# Patient Record
Sex: FEMALE | Race: BLACK OR AFRICAN AMERICAN | NOT HISPANIC OR LATINO
[De-identification: names, ages, dates, MRNs, and addresses within clinical notes are randomized per-mention and may not be internally consistent; named-entity substitution may affect disease eponyms.]

---

## 2017-02-22 ENCOUNTER — RESULT REVIEW (OUTPATIENT)
Age: 62
End: 2017-02-22

## 2018-01-23 ENCOUNTER — APPOINTMENT (OUTPATIENT)
Dept: THORACIC SURGERY | Facility: CLINIC | Age: 63
End: 2018-01-23
Payer: COMMERCIAL

## 2018-01-23 VITALS
HEART RATE: 82 BPM | BODY MASS INDEX: 25.99 KG/M2 | DIASTOLIC BLOOD PRESSURE: 78 MMHG | HEIGHT: 65 IN | WEIGHT: 156 LBS | SYSTOLIC BLOOD PRESSURE: 104 MMHG | OXYGEN SATURATION: 98 %

## 2018-01-23 DIAGNOSIS — Z78.9 OTHER SPECIFIED HEALTH STATUS: ICD-10-CM

## 2018-01-23 PROCEDURE — 99205 OFFICE O/P NEW HI 60 MIN: CPT

## 2018-01-24 ENCOUNTER — APPOINTMENT (OUTPATIENT)
Dept: PULMONOLOGY | Facility: CLINIC | Age: 63
End: 2018-01-24
Payer: COMMERCIAL

## 2018-01-24 PROCEDURE — 94729 DIFFUSING CAPACITY: CPT

## 2018-01-24 PROCEDURE — 94726 PLETHYSMOGRAPHY LUNG VOLUMES: CPT

## 2018-01-24 PROCEDURE — 94060 EVALUATION OF WHEEZING: CPT

## 2018-01-29 ENCOUNTER — APPOINTMENT (OUTPATIENT)
Dept: NUCLEAR MEDICINE | Facility: IMAGING CENTER | Age: 63
End: 2018-01-29
Payer: COMMERCIAL

## 2018-01-29 ENCOUNTER — OUTPATIENT (OUTPATIENT)
Dept: OUTPATIENT SERVICES | Facility: HOSPITAL | Age: 63
LOS: 1 days | End: 2018-01-29
Payer: COMMERCIAL

## 2018-01-29 DIAGNOSIS — C34.90 MALIGNANT NEOPLASM OF UNSPECIFIED PART OF UNSPECIFIED BRONCHUS OR LUNG: ICD-10-CM

## 2018-01-29 DIAGNOSIS — R91.1 SOLITARY PULMONARY NODULE: ICD-10-CM

## 2018-01-29 PROCEDURE — A9552: CPT

## 2018-01-29 PROCEDURE — 78815 PET IMAGE W/CT SKULL-THIGH: CPT | Mod: 26,PI

## 2018-01-29 PROCEDURE — 78815 PET IMAGE W/CT SKULL-THIGH: CPT

## 2018-01-30 ENCOUNTER — OUTPATIENT (OUTPATIENT)
Dept: OUTPATIENT SERVICES | Facility: HOSPITAL | Age: 63
LOS: 1 days | End: 2018-01-30
Payer: COMMERCIAL

## 2018-01-30 ENCOUNTER — OUTPATIENT (OUTPATIENT)
Dept: OUTPATIENT SERVICES | Facility: HOSPITAL | Age: 63
LOS: 1 days | End: 2018-01-30

## 2018-01-30 ENCOUNTER — APPOINTMENT (OUTPATIENT)
Dept: NUCLEAR MEDICINE | Facility: HOSPITAL | Age: 63
End: 2018-01-30
Payer: COMMERCIAL

## 2018-01-30 VITALS
HEART RATE: 67 BPM | SYSTOLIC BLOOD PRESSURE: 100 MMHG | WEIGHT: 149.91 LBS | TEMPERATURE: 97 F | DIASTOLIC BLOOD PRESSURE: 70 MMHG | HEIGHT: 65 IN | OXYGEN SATURATION: 97 % | RESPIRATION RATE: 14 BRPM

## 2018-01-30 DIAGNOSIS — R91.1 SOLITARY PULMONARY NODULE: ICD-10-CM

## 2018-01-30 DIAGNOSIS — Z90.2 ACQUIRED ABSENCE OF LUNG [PART OF]: Chronic | ICD-10-CM

## 2018-01-30 LAB
BLD GP AB SCN SERPL QL: NEGATIVE — SIGNIFICANT CHANGE UP
BUN SERPL-MCNC: 18 MG/DL — SIGNIFICANT CHANGE UP (ref 7–23)
CALCIUM SERPL-MCNC: 9.5 MG/DL — SIGNIFICANT CHANGE UP (ref 8.4–10.5)
CHLORIDE SERPL-SCNC: 98 MMOL/L — SIGNIFICANT CHANGE UP (ref 98–107)
CO2 SERPL-SCNC: 34 MMOL/L — HIGH (ref 22–31)
CREAT SERPL-MCNC: 1.09 MG/DL — SIGNIFICANT CHANGE UP (ref 0.5–1.3)
GLUCOSE SERPL-MCNC: 91 MG/DL — SIGNIFICANT CHANGE UP (ref 70–99)
HCT VFR BLD CALC: 43.3 % — SIGNIFICANT CHANGE UP (ref 34.5–45)
HGB BLD-MCNC: 14.7 G/DL — SIGNIFICANT CHANGE UP (ref 11.5–15.5)
MCHC RBC-ENTMCNC: 31.7 PG — SIGNIFICANT CHANGE UP (ref 27–34)
MCHC RBC-ENTMCNC: 33.9 % — SIGNIFICANT CHANGE UP (ref 32–36)
MCV RBC AUTO: 93.5 FL — SIGNIFICANT CHANGE UP (ref 80–100)
NRBC # FLD: 0 — SIGNIFICANT CHANGE UP
PLATELET # BLD AUTO: 242 K/UL — SIGNIFICANT CHANGE UP (ref 150–400)
PMV BLD: 10.8 FL — SIGNIFICANT CHANGE UP (ref 7–13)
POTASSIUM SERPL-MCNC: 3.1 MMOL/L — LOW (ref 3.5–5.3)
POTASSIUM SERPL-SCNC: 3.1 MMOL/L — LOW (ref 3.5–5.3)
RBC # BLD: 4.63 M/UL — SIGNIFICANT CHANGE UP (ref 3.8–5.2)
RBC # FLD: 13.2 % — SIGNIFICANT CHANGE UP (ref 10.3–14.5)
RH IG SCN BLD-IMP: POSITIVE — SIGNIFICANT CHANGE UP
SODIUM SERPL-SCNC: 143 MMOL/L — SIGNIFICANT CHANGE UP (ref 135–145)
WBC # BLD: 9.35 K/UL — SIGNIFICANT CHANGE UP (ref 3.8–10.5)
WBC # FLD AUTO: 9.35 K/UL — SIGNIFICANT CHANGE UP (ref 3.8–10.5)

## 2018-01-30 PROCEDURE — 93010 ELECTROCARDIOGRAM REPORT: CPT

## 2018-01-30 PROCEDURE — 78597 LUNG PERFUSION DIFFERENTIAL: CPT | Mod: 26

## 2018-01-30 RX ORDER — SODIUM CHLORIDE 9 MG/ML
1000 INJECTION, SOLUTION INTRAVENOUS
Qty: 0 | Refills: 0 | Status: DISCONTINUED | OUTPATIENT
Start: 2018-02-15 | End: 2018-02-16

## 2018-01-30 NOTE — H&P PST ADULT - PSYCHIATRIC
negative Affect and characteristics of appearance, verbalizations, behaviors are appropriate details… detailed exam

## 2018-01-30 NOTE — H&P PST ADULT - NEGATIVE GENERAL GENITOURINARY SYMPTOMS
normal urinary frequency/no dysuria/no hematuria/no flank pain R/no bladder infections/no flank pain L

## 2018-01-30 NOTE — H&P PST ADULT - NEGATIVE BREAST SYMPTOMS
no breast tenderness R/no breast tenderness L/no breast lump L/no breast lump R/no nipple discharge L

## 2018-01-30 NOTE — H&P PST ADULT - HISTORY OF PRESENT ILLNESS
63y/o female scheduled for flexible bronchoscopy, right video assisted thoracoscopy, possible right thoracotomy, lung resection with epidural on 2/1/2018.  Pt states, "hx of left VATS, upper lobectomy, s/p chemo and radiation 2010.  Cat scan done every yr, 2 yrs ago identified nodule in right lung, f/u ct shows nodule has grown in size."

## 2018-01-30 NOTE — H&P PST ADULT - PMH
Benign Essential Hypertension    Hypercholesteremia Benign Essential Hypertension    Hypercholesteremia    Solitary pulmonary nodule

## 2018-01-30 NOTE — H&P PST ADULT - RS GEN PE MLT RESP DETAILS PC
breath sounds equal/clear to auscultation bilaterally breath sounds equal/no intercostal retractions/no rales/no wheezes/respirations non-labored/no chest wall tenderness/good air movement/clear to auscultation bilaterally

## 2018-01-30 NOTE — H&P PST ADULT - MUSCULOSKELETAL
negative No joint pain, swelling or deformity; no limitation of movement details… detailed exam normal strength/ROM intact

## 2018-01-30 NOTE — H&P PST ADULT - NEGATIVE CARDIOVASCULAR SYMPTOMS
no orthopnea/no chest pain/no palpitations/no claudication/no peripheral edema/no paroxysmal nocturnal dyspnea/no dyspnea on exertion

## 2018-01-30 NOTE — H&P PST ADULT - PROBLEM SELECTOR PLAN 1
Pt scheduled for flexible bronchoscopy, right video assisted thoracoscopy, possible right thoracotomy, lung resection with epidural.  labs done results pending, ekg done.  Pt stated, "Dr. Easley office requesting cardiology clearance"  hibiclens provided. Preop teaching done, pt able to verbalize understanding.

## 2018-01-30 NOTE — H&P PST ADULT - NEGATIVE GENERAL SYMPTOMS
no weight loss/no weight gain/no polyphagia/no polyuria/no fatigue/no fever/no chills/no sweating/no anorexia/no malaise/no polydipsia

## 2018-01-30 NOTE — H&P PST ADULT - NSANTHOSAYNRD_GEN_A_CORE
No. GLENDA screening performed.  STOP BANG Legend: 0-2 = LOW Risk; 3-4 = INTERMEDIATE Risk; 5-8 = HIGH Risk

## 2018-01-30 NOTE — H&P PST ADULT - GASTROINTESTINAL DETAILS
soft/no distention no rebound tenderness/bowel sounds normal/soft/no bruit/no distention/no masses palpable/no guarding/no organomegaly/no rigidity/nontender

## 2018-02-01 ENCOUNTER — TRANSCRIPTION ENCOUNTER (OUTPATIENT)
Age: 63
End: 2018-02-01

## 2018-02-12 ENCOUNTER — OUTPATIENT (OUTPATIENT)
Dept: OUTPATIENT SERVICES | Facility: HOSPITAL | Age: 63
LOS: 1 days | End: 2018-02-12

## 2018-02-12 DIAGNOSIS — Z90.2 ACQUIRED ABSENCE OF LUNG [PART OF]: Chronic | ICD-10-CM

## 2018-02-12 LAB
BLD GP AB SCN SERPL QL: NEGATIVE — SIGNIFICANT CHANGE UP
RH IG SCN BLD-IMP: POSITIVE — SIGNIFICANT CHANGE UP

## 2018-02-15 ENCOUNTER — INPATIENT (INPATIENT)
Facility: HOSPITAL | Age: 63
LOS: 0 days | Discharge: HOME | End: 2018-02-16
Attending: THORACIC SURGERY (CARDIOTHORACIC VASCULAR SURGERY) | Admitting: THORACIC SURGERY (CARDIOTHORACIC VASCULAR SURGERY)
Payer: COMMERCIAL

## 2018-02-15 ENCOUNTER — RESULT REVIEW (OUTPATIENT)
Age: 63
End: 2018-02-15

## 2018-02-15 ENCOUNTER — TRANSCRIPTION ENCOUNTER (OUTPATIENT)
Age: 63
End: 2018-02-15

## 2018-02-15 ENCOUNTER — APPOINTMENT (OUTPATIENT)
Dept: THORACIC SURGERY | Facility: HOSPITAL | Age: 63
End: 2018-02-15
Payer: COMMERCIAL

## 2018-02-15 VITALS
SYSTOLIC BLOOD PRESSURE: 96 MMHG | HEIGHT: 65 IN | OXYGEN SATURATION: 98 % | WEIGHT: 149.91 LBS | HEART RATE: 64 BPM | TEMPERATURE: 98 F | RESPIRATION RATE: 16 BRPM | DIASTOLIC BLOOD PRESSURE: 68 MMHG

## 2018-02-15 DIAGNOSIS — Z90.2 ACQUIRED ABSENCE OF LUNG [PART OF]: Chronic | ICD-10-CM

## 2018-02-15 DIAGNOSIS — R91.1 SOLITARY PULMONARY NODULE: ICD-10-CM

## 2018-02-15 LAB
ALBUMIN SERPL ELPH-MCNC: 4.1 G/DL — SIGNIFICANT CHANGE UP (ref 3.3–5)
ALP SERPL-CCNC: 63 U/L — SIGNIFICANT CHANGE UP (ref 40–120)
ALT FLD-CCNC: 11 U/L — SIGNIFICANT CHANGE UP (ref 4–33)
APTT BLD: 28.2 SEC — SIGNIFICANT CHANGE UP (ref 27.5–37.4)
AST SERPL-CCNC: 18 U/L — SIGNIFICANT CHANGE UP (ref 4–32)
BASE EXCESS BLDA CALC-SCNC: 4.4 MMOL/L — SIGNIFICANT CHANGE UP
BILIRUB SERPL-MCNC: 0.3 MG/DL — SIGNIFICANT CHANGE UP (ref 0.2–1.2)
BUN SERPL-MCNC: 18 MG/DL — SIGNIFICANT CHANGE UP (ref 7–23)
CA-I BLDA-SCNC: 1.2 MMOL/L — SIGNIFICANT CHANGE UP (ref 1.15–1.29)
CALCIUM SERPL-MCNC: 8.7 MG/DL — SIGNIFICANT CHANGE UP (ref 8.4–10.5)
CHLORIDE SERPL-SCNC: 102 MMOL/L — SIGNIFICANT CHANGE UP (ref 98–107)
CO2 SERPL-SCNC: 29 MMOL/L — SIGNIFICANT CHANGE UP (ref 22–31)
CREAT SERPL-MCNC: 0.99 MG/DL — SIGNIFICANT CHANGE UP (ref 0.5–1.3)
GLUCOSE BLDA-MCNC: 114 MG/DL — HIGH (ref 70–99)
GLUCOSE SERPL-MCNC: 116 MG/DL — HIGH (ref 70–99)
HCO3 BLDA-SCNC: 28 MMOL/L — HIGH (ref 22–26)
HCT VFR BLD CALC: 39.2 % — SIGNIFICANT CHANGE UP (ref 34.5–45)
HCT VFR BLDA CALC: 40 % — SIGNIFICANT CHANGE UP (ref 34.5–46.5)
HGB BLD-MCNC: 13.2 G/DL — SIGNIFICANT CHANGE UP (ref 11.5–15.5)
HGB BLDA-MCNC: 13 G/DL — SIGNIFICANT CHANGE UP (ref 11.5–15.5)
INR BLD: 0.98 — SIGNIFICANT CHANGE UP (ref 0.88–1.17)
LACTATE BLDA-SCNC: 0.6 MMOL/L — SIGNIFICANT CHANGE UP (ref 0.5–2)
MAGNESIUM SERPL-MCNC: 2 MG/DL — SIGNIFICANT CHANGE UP (ref 1.6–2.6)
MCHC RBC-ENTMCNC: 31.3 PG — SIGNIFICANT CHANGE UP (ref 27–34)
MCHC RBC-ENTMCNC: 33.7 % — SIGNIFICANT CHANGE UP (ref 32–36)
MCV RBC AUTO: 92.9 FL — SIGNIFICANT CHANGE UP (ref 80–100)
NRBC # FLD: 0 — SIGNIFICANT CHANGE UP
PCO2 BLDA: 50 MMHG — HIGH (ref 32–48)
PH BLDA: 7.38 PH — SIGNIFICANT CHANGE UP (ref 7.35–7.45)
PHOSPHATE SERPL-MCNC: 3.1 MG/DL — SIGNIFICANT CHANGE UP (ref 2.5–4.5)
PLATELET # BLD AUTO: 214 K/UL — SIGNIFICANT CHANGE UP (ref 150–400)
PMV BLD: 10.1 FL — SIGNIFICANT CHANGE UP (ref 7–13)
PO2 BLDA: 162 MMHG — HIGH (ref 83–108)
POTASSIUM BLDA-SCNC: 3.1 MMOL/L — LOW (ref 3.4–4.5)
POTASSIUM BLDV-SCNC: 3.3 MMOL/L — LOW (ref 3.4–4.5)
POTASSIUM SERPL-MCNC: 3.3 MMOL/L — LOW (ref 3.5–5.3)
POTASSIUM SERPL-SCNC: 3.3 MMOL/L — LOW (ref 3.5–5.3)
PROT SERPL-MCNC: 6.6 G/DL — SIGNIFICANT CHANGE UP (ref 6–8.3)
PROTHROM AB SERPL-ACNC: 10.9 SEC — SIGNIFICANT CHANGE UP (ref 9.8–13.1)
RBC # BLD: 4.22 M/UL — SIGNIFICANT CHANGE UP (ref 3.8–5.2)
RBC # FLD: 13.1 % — SIGNIFICANT CHANGE UP (ref 10.3–14.5)
SAO2 % BLDA: 99 % — SIGNIFICANT CHANGE UP (ref 95–99)
SODIUM BLDA-SCNC: 139 MMOL/L — SIGNIFICANT CHANGE UP (ref 136–146)
SODIUM SERPL-SCNC: 143 MMOL/L — SIGNIFICANT CHANGE UP (ref 135–145)
WBC # BLD: 15.97 K/UL — HIGH (ref 3.8–10.5)
WBC # FLD AUTO: 15.97 K/UL — HIGH (ref 3.8–10.5)

## 2018-02-15 PROCEDURE — 32669 THORACOSCOPY REMOVE SEGMENT: CPT

## 2018-02-15 PROCEDURE — 32666 THORACOSCOPY W/WEDGE RESECT: CPT | Mod: 59

## 2018-02-15 PROCEDURE — 32669 THORACOSCOPY REMOVE SEGMENT: CPT | Mod: AS

## 2018-02-15 PROCEDURE — 88305 TISSUE EXAM BY PATHOLOGIST: CPT | Mod: 26

## 2018-02-15 PROCEDURE — 32674 THORACOSCOPY LYMPH NODE EXC: CPT | Mod: AS

## 2018-02-15 PROCEDURE — 88309 TISSUE EXAM BY PATHOLOGIST: CPT | Mod: 26

## 2018-02-15 PROCEDURE — 71045 X-RAY EXAM CHEST 1 VIEW: CPT | Mod: 26

## 2018-02-15 PROCEDURE — 88307 TISSUE EXAM BY PATHOLOGIST: CPT | Mod: 26

## 2018-02-15 PROCEDURE — 32674 THORACOSCOPY LYMPH NODE EXC: CPT

## 2018-02-15 PROCEDURE — 32666 THORACOSCOPY W/WEDGE RESECT: CPT | Mod: AS,59

## 2018-02-15 PROCEDURE — S2900 ROBOTIC SURGICAL SYSTEM: CPT | Mod: NC

## 2018-02-15 PROCEDURE — 88331 PATH CONSLTJ SURG 1 BLK 1SPC: CPT | Mod: 26

## 2018-02-15 PROCEDURE — 99233 SBSQ HOSP IP/OBS HIGH 50: CPT

## 2018-02-15 RX ORDER — DOCUSATE SODIUM 100 MG
100 CAPSULE ORAL THREE TIMES A DAY
Qty: 0 | Refills: 0 | Status: DISCONTINUED | OUTPATIENT
Start: 2018-02-15 | End: 2018-02-16

## 2018-02-15 RX ORDER — ATENOLOL 25 MG/1
1 TABLET ORAL
Qty: 0 | Refills: 0 | COMMUNITY

## 2018-02-15 RX ORDER — ROSUVASTATIN CALCIUM 5 MG/1
1 TABLET ORAL
Qty: 0 | Refills: 0 | COMMUNITY

## 2018-02-15 RX ORDER — AMLODIPINE BESYLATE 2.5 MG/1
1 TABLET ORAL
Qty: 0 | Refills: 0 | COMMUNITY

## 2018-02-15 RX ORDER — FAMOTIDINE 10 MG/ML
20 INJECTION INTRAVENOUS
Qty: 0 | Refills: 0 | Status: DISCONTINUED | OUTPATIENT
Start: 2018-02-15 | End: 2018-02-16

## 2018-02-15 RX ORDER — HEPARIN SODIUM 5000 [USP'U]/ML
5000 INJECTION INTRAVENOUS; SUBCUTANEOUS EVERY 8 HOURS
Qty: 0 | Refills: 0 | Status: DISCONTINUED | OUTPATIENT
Start: 2018-02-15 | End: 2018-02-16

## 2018-02-15 RX ORDER — POTASSIUM CHLORIDE 20 MEQ
40 PACKET (EA) ORAL ONCE
Qty: 0 | Refills: 0 | Status: COMPLETED | OUTPATIENT
Start: 2018-02-15 | End: 2018-02-15

## 2018-02-15 RX ORDER — POTASSIUM CHLORIDE 20 MEQ
10 PACKET (EA) ORAL
Qty: 0 | Refills: 0 | Status: DISCONTINUED | OUTPATIENT
Start: 2018-02-15 | End: 2018-02-15

## 2018-02-15 RX ORDER — HYDROMORPHONE HYDROCHLORIDE 2 MG/ML
30 INJECTION INTRAMUSCULAR; INTRAVENOUS; SUBCUTANEOUS
Qty: 0 | Refills: 0 | Status: DISCONTINUED | OUTPATIENT
Start: 2018-02-15 | End: 2018-02-16

## 2018-02-15 RX ORDER — ATORVASTATIN CALCIUM 80 MG/1
20 TABLET, FILM COATED ORAL AT BEDTIME
Qty: 0 | Refills: 0 | Status: DISCONTINUED | OUTPATIENT
Start: 2018-02-15 | End: 2018-02-16

## 2018-02-15 RX ORDER — ONDANSETRON 8 MG/1
4 TABLET, FILM COATED ORAL EVERY 6 HOURS
Qty: 0 | Refills: 0 | Status: DISCONTINUED | OUTPATIENT
Start: 2018-02-15 | End: 2018-02-16

## 2018-02-15 RX ORDER — DEXAMETHASONE 0.5 MG/5ML
4 ELIXIR ORAL EVERY 6 HOURS
Qty: 0 | Refills: 0 | Status: DISCONTINUED | OUTPATIENT
Start: 2018-02-15 | End: 2018-02-16

## 2018-02-15 RX ADMIN — HEPARIN SODIUM 5000 UNIT(S): 5000 INJECTION INTRAVENOUS; SUBCUTANEOUS at 22:25

## 2018-02-15 RX ADMIN — HEPARIN SODIUM 5000 UNIT(S): 5000 INJECTION INTRAVENOUS; SUBCUTANEOUS at 15:06

## 2018-02-15 RX ADMIN — Medication 100 MILLIGRAM(S): at 22:25

## 2018-02-15 RX ADMIN — ATORVASTATIN CALCIUM 20 MILLIGRAM(S): 80 TABLET, FILM COATED ORAL at 22:25

## 2018-02-15 RX ADMIN — Medication 100 MILLIEQUIVALENT(S): at 15:44

## 2018-02-15 RX ADMIN — Medication 100 MILLIGRAM(S): at 15:06

## 2018-02-15 RX ADMIN — FAMOTIDINE 20 MILLIGRAM(S): 10 INJECTION INTRAVENOUS at 17:53

## 2018-02-15 RX ADMIN — Medication 40 MILLIEQUIVALENT(S): at 17:54

## 2018-02-15 RX ADMIN — SODIUM CHLORIDE 30 MILLILITER(S): 9 INJECTION, SOLUTION INTRAVENOUS at 15:07

## 2018-02-15 NOTE — BRIEF OPERATIVE NOTE - PROCEDURE
<<-----Click on this checkbox to enter Procedure Thoracoscopic segmentectomy  02/15/2018  Right uniport VATS, RLL wedge, RLL superior segmentectomy, lymph node dissection,  Intercostal nerve block  Active  [facility]

## 2018-02-15 NOTE — PROGRESS NOTE ADULT - SUBJECTIVE AND OBJECTIVE BOX
ROCIO AGUAYO          MRN-[id number]    HPI:  63y/o female scheduled for flexible bronchoscopy, right video assisted thoracoscopy, possible right thoracotomy, lung resection with epidural on 2/1/2018.  Pt states, "hx of left VATS, upper lobectomy, s/p chemo and radiation 2010.  Cat scan done every yr, 2 yrs ago identified nodule in right lung, f/u ct shows nodule has grown in size." (30 Jan 2018 15:13)      Procedure:  POD # :     Issues:        Interval/Overnight Events/ ROS  Pt remained hemodynamically stable overnight, not on any pressors or inotropes. OOB to chair, breathing comfortably with minimal pain. Ambulated several times . Denies pain, no SOB, no palpitations, no nausea/ no vomiting, no dizziness  A-line and fraser d/joaquina         PAST MEDICAL & SURGICAL HISTORY:  Solitary pulmonary nodule  Hypercholesteremia  Benign Essential Hypertension  S/P lobectomy of lung: left VATS, left upper lobectomy 2010  No Past Surgical History    Allergies    No Known Allergies    Intolerances            ***VITAL SIGNS:  Vital Signs Last 24 Hrs  T(C): 36.7 (15 Feb 2018 08:51), Max: 36.7 (15 Feb 2018 08:51)  T(F): 98.1 (15 Feb 2018 08:51), Max: 98.1 (15 Feb 2018 08:51)  HR: 64 (15 Feb 2018 08:51) (64 - 64)  BP: 96/68 (15 Feb 2018 08:51) (96/68 - 96/68)  BP(mean): --  RR: 16 (15 Feb 2018 08:51) (16 - 16)  SpO2: 98% (15 Feb 2018 08:51) (98% - 98%)    I/Os:   I&O's Detail      CAPILLARY BLOOD GLUCOSE          =======================  MEDICATIONS  ===================  MEDICATIONS  (STANDING):  atorvastatin 20 milliGRAM(s) Oral at bedtime  docusate sodium 100 milliGRAM(s) Oral three times a day  famotidine    Tablet 20 milliGRAM(s) Oral two times a day  heparin  Injectable 5000 Unit(s) SubCutaneous every 8 hours  HYDROmorphone PCA (1 mG/mL) 30 milliLiter(s) PCA Continuous PCA Continuous  lactated ringers. 1000 milliLiter(s) (30 mL/Hr) IV Continuous <Continuous>    MEDICATIONS  (PRN):  dexamethasone  Injectable 4 milliGRAM(s) IV Push every 6 hours PRN Nausea, IF ondansetron is ineffective after 30 - 60 minute  ondansetron Injectable 4 milliGRAM(s) IV Push every 6 hours PRN Nausea      ======================VENTILATOR SETTINGS  ==============      =================== PATIENT CARE ACCESS DEVICES ==========  Peripheral IV  Central Venous Line	R	L	IJ	Fem	SC			Placed:   Arterial Line	R	L	PT	DP	Fem	Rad	Ax	Placed:   Midline:				  Urinary Catheter, Date Placed:   Necessity of urinary, arterial, and venous catheters discussed    ======================= PHYSICAL EXAM===================  General:                         Comfortable, Awake, alert, not in any distress  Neuro:                            Moving all extremities to commands. No focal deficits	  HEENT:                           PRASHANTH/ ETT/ NGT/ trach  Respiratory:	Lungs clear on auscultation bilaterally with good aeration.                                           No rales, rhonchi, no wheezing. Effort even and unlabored.  CV:		Regular rate and rhythm. Normal S1/S2. No murmurs  Abdomen:	                     Soft,  nontender, not-distended. Bowel sounds present / absent.   Skin:		No rash.  Extremities:	Warm, no cyanosis or edema.  Palpable pulses    ============================ LABS =======================                      ===================== IMAGING STUDIES ===================  Radiology personally reviewed.    ====================ASSESSMENT AND PLAN ================      ====================== NEUROLOGY=======================  Pain control with PCA / PCEA / Tylenol IV / Toradol / Percocet  Pt is on Precedex for agitation  Pt is sedated with Propofol / Fentanyl    ==================== RESPIRATORY========================  Pt is on            L nasal canula / Face tent____% FiO2  Comfortable, no evidence of distress.  Using incentive spirometry & doing                ml  Monitor chest tube output  Chest tube to suction / water seal	    Mechanical Ventilation:    Mechanical ventilator status assessed & settings reviewed  Continue bronchodilators, pulmonary toilet  Head of bed elevation to 30-40 degrees    ====================CARDIOVASCULAR=====================  Continue hemodynamic monitoring/ telemetry  Not on any pressors  Continue cardiovascular / antihypertensive medications    ===================== RENAL ============================  Continue LR 30CC/hr      D/C IVF  Monitor I/Os, BUN/ Cr  and electrolytes  D/C Fraser      Keep Fraser  BPH: Continue Flomax/ Finasteride      ==================== GASTROINTESTINAL===================  On regular diet, tolerating well  Continue GI prophylaxis with Pepcid / Protonix  Continue Zofran / Reglan for nausea - PRN	  NPO    =======================    ENDOCRIN  =====================  Glycemic monitoring  F/S with coverage  ===================HEMATOLOGIC/ONCOLOGIC =============  Monitor chest tube output. No signs of active bleeding.   Follow CBC, coags  in AM  DVT prophylaxis with SCD, sc Heparin    ========================INFECTIOUS DISEASE===============  No signs of infection. Monitor for fever / leukocytosis.  All surgical incision / chest tube  sites look clean  D/C Fraser      Pertinent clinical, laboratory, radiographic, hemodynamic, echocardiographic, respiratory data, microbiologic data and chart were reviewed and analyzed frequently throughout the course of the day and night. GI and DVT prophylaxis, glycemic control, head of bed elevation and skin care issues were addressed.  Patient seen, examined and plan discussed with CT Surgery / CTICU team during rounds.  Pt remains critically ill in imminent risk of  deterioration and requires very careful cardio- pulmonary monitoring and support.    I have spent               minutes of critical care time with this pt between            am/pm    and               am/ pm         minutes spent on total encounter; more than 50% of the visit was spent counseling and/or coordinating care by the attending physician.        DESHAWN Donnelly MD ROCIO AGUAYO          MRN-[id number]    HPI:  63y/o female with RLL lung mass admitted  for flexible bronchoscopy, right video assisted thoracoscopy, possible right thoracotomy, lung resection .  Pt states, "hx of left VATS, upper lobectomy, s/p chemo and radiation 2010.  Cat scan done every yr, 2 yrs ago identified nodule in right lung, f/u ct shows nodule has grown in size."      Procedure:  02/15/18 Thoracoscopic segmentectomy,  Right uniport VATS, RLL wedge, RLL superior segmentectomy, lymph node dissection,  Intercostal nerve block   POD # : 0    Issues:    postop pain               right chest tube in place               Hx lung cancer 2010, HTn, dyslipidemia        Interval/OR Events/ ROS  Pt extubated in the OR post uneventful surgery. Arrived in ICU awake, c/o chest tube site pain; no SOB, no palpitations, no nausea         PAST MEDICAL & SURGICAL HISTORY:  Solitary pulmonary nodule  Hypercholesteremia  Benign Essential Hypertension  S/P lobectomy of lung: left VATS, left upper lobectomy 2010      Allergies  No Known Allergies          ***VITAL SIGNS:  Vital Signs Last 24 Hrs  T(C): 36.7 (15 Feb 2018 08:51), Max: 36.7 (15 Feb 2018 08:51)  T(F): 98.1 (15 Feb 2018 08:51), Max: 98.1 (15 Feb 2018 08:51)  HR: 64 (15 Feb 2018 08:51) (64 - 64)  BP: 96/68 (15 Feb 2018 08:51) (96/68 - 96/68)  BP(mean): --  RR: 16 (15 Feb 2018 08:51) (16 - 16)  SpO2: 98% (15 Feb 2018 08:51) (98% - 98%)    I/Os:   I&O's Detail      CAPILLARY BLOOD GLUCOSE          =======================  MEDICATIONS  ===================  MEDICATIONS  (STANDING):  atorvastatin 20 milliGRAM(s) Oral at bedtime  docusate sodium 100 milliGRAM(s) Oral three times a day  famotidine    Tablet 20 milliGRAM(s) Oral two times a day  heparin  Injectable 5000 Unit(s) SubCutaneous every 8 hours  HYDROmorphone PCA (1 mG/mL) 30 milliLiter(s) PCA Continuous  lactated ringers. 1000 milliLiter(s) (30 mL/Hr) IV Continuous    MEDICATIONS  (PRN):  dexamethasone  Injectable 4 milliGRAM(s) IV Push every 6 hours PRN Nausea, IF ondansetron is ineffective after 30 - 60 minute  ondansetron Injectable 4 milliGRAM(s) IV Push every 6 hours PRN Nausea          =================== PATIENT CARE ACCESS DEVICES ==========  Peripheral IV (+)   Arterial Line	Left / Rad(+)  No White    ======================= PHYSICAL EXAM===================  General:              Awake, alert, in mild  distress due to pain  Neuro:               Moving all extremities to commands. No focal deficits	  HEENT:                           PRASHANTH  Respiratory:	Lungs clear on auscultation bilaterally with good aeration.                           No rales, rhonchi, no wheezing. Effort even and unlabored.                          Right chest tube site - clean  CV:		Regular rate and rhythm. Normal S1/S2. No murmurs  Abdomen:         Soft,  nontender, not-distended. Bowel sounds present  Skin:		No rash.  Extremities:	Warm, no cyanosis or edema.  Palpable pulses    ============================ LABS =======================                      ===================== IMAGING STUDIES ===================  Radiology personally reviewed.    ====================ASSESSMENT AND PLAN ================  63y/o female with RLL lung mass  now is s/p  02/15/18 Thoracoscopic segmentectomy,  Right uniport VATS, RLL wedge, RLL superior segmentectomy, lymph node dissection,  Intercostal nerve block   POD # : 0    Issues:    postop pain               right chest tube in place               Hx lung cancer 2010, HTn, dyslipidemia      ====================== NEUROLOGY=======================  Pain control with PCA /Tylenol IV / Toradol / Percocet  Pt is on Precedex for agitation  Pt is sedated with Propofol / Fentanyl    ==================== RESPIRATORY========================  Pt is on            L nasal canula / Face tent____% FiO2  Comfortable, no evidence of distress.  Using incentive spirometry & doing                ml  Monitor chest tube output  Chest tube to suction / water seal	    Mechanical Ventilation:    Mechanical ventilator status assessed & settings reviewed  Continue bronchodilators, pulmonary toilet  Head of bed elevation to 30-40 degrees    ====================CARDIOVASCULAR=====================  Continue hemodynamic monitoring/ telemetry  Not on any pressors  Continue cardiovascular / antihypertensive medications    ===================== RENAL ============================  Continue LR 30CC/hr      D/C IVF  Monitor I/Os, BUN/ Cr  and electrolytes  D/C White      Keep White  BPH: Continue Flomax/ Finasteride      ==================== GASTROINTESTINAL===================  On regular diet, tolerating well  Continue GI prophylaxis with Pepcid / Protonix  Continue Zofran / Reglan for nausea - PRN	  NPO    =======================    ENDOCRIN  =====================  Glycemic monitoring  F/S with coverage  ===================HEMATOLOGIC/ONCOLOGIC =============  Monitor chest tube output. No signs of active bleeding.   Follow CBC, coags  in AM  DVT prophylaxis with SCD, sc Heparin    ========================INFECTIOUS DISEASE===============  No signs of infection. Monitor for fever / leukocytosis.  All surgical incision / chest tube  sites look clean  D/C White      Pertinent clinical, laboratory, radiographic, hemodynamic, echocardiographic, respiratory data, microbiologic data and chart were reviewed and analyzed frequently throughout the course of the day and night. GI and DVT prophylaxis, glycemic control, head of bed elevation and skin care issues were addressed.  Patient seen, examined and plan discussed with CT Surgery / CTICU team during rounds.  Pt remains critically ill in imminent risk of  deterioration and requires very careful cardio- pulmonary monitoring and support.    I have spent               minutes of critical care time with this pt between            am/pm    and               am/ pm         minutes spent on total encounter; more than 50% of the visit was spent counseling and/or coordinating care by the attending physician.        DESHAWN Donnelly MD ROCIO AGUAYO          MRN-[id number]    HPI:  61y/o female with RLL lung mass admitted  for flexible bronchoscopy, right video assisted thoracoscopy, possible right thoracotomy, lung resection .  Pt states, "hx of left VATS, upper lobectomy, s/p chemo and radiation 2010.  Cat scan done every yr, 2 yrs ago identified nodule in right lung, f/u ct shows nodule has grown in size."      Procedure:  02/15/18 Thoracoscopic segmentectomy,  Right uniport VATS, RLL wedge, RLL superior segmentectomy, lymph node dissection,  Intercostal nerve block   POD # : 0    Issues:    postop pain               right chest tube in place               Hx lung cancer 2010, HTn, dyslipidemia        Interval/OR Events/ ROS  Pt extubated in the OR post uneventful surgery. Arrived in ICU awake, c/o chest tube site pain; no SOB, no palpitations, no nausea         PAST MEDICAL & SURGICAL HISTORY:  Solitary pulmonary nodule  Hypercholesteremia  Benign Essential Hypertension  S/P lobectomy of lung: left VATS, left upper lobectomy 2010      Allergies  No Known Allergies          ***VITAL SIGNS:  Vital Signs Last 24 Hrs  T(C): 36.7 (15 Feb 2018 08:51), Max: 36.7 (15 Feb 2018 08:51)  T(F): 98.1 (15 Feb 2018 08:51), Max: 98.1 (15 Feb 2018 08:51)  HR: 64 (15 Feb 2018 08:51) (64 - 64)  BP: 96/68 (15 Feb 2018 08:51) (96/68 - 96/68)  BP(mean): --  RR: 16 (15 Feb 2018 08:51) (16 - 16)  SpO2: 98% (15 Feb 2018 08:51) (98% - 98%)    I/Os:   I&O's Detail        CAPILLARY BLOOD GLUCOSE          =======================  MEDICATIONS  ===================  MEDICATIONS  (STANDING):  atorvastatin 20 milliGRAM(s) Oral at bedtime  docusate sodium 100 milliGRAM(s) Oral three times a day  famotidine    Tablet 20 milliGRAM(s) Oral two times a day  heparin  Injectable 5000 Unit(s) SubCutaneous every 8 hours  HYDROmorphone PCA (1 mG/mL) 30 milliLiter(s) PCA Continuous  lactated ringers. 1000 milliLiter(s) (30 mL/Hr) IV Continuous    MEDICATIONS  (PRN):  dexamethasone  Injectable 4 milliGRAM(s) IV Push every 6 hours PRN Nausea, IF ondansetron is ineffective after 30 - 60 minute  ondansetron Injectable 4 milliGRAM(s) IV Push every 6 hours PRN Nausea          =================== PATIENT CARE ACCESS DEVICES ==========  Peripheral IV (+)   Arterial Line	Left / Rad(+)  No White    ======================= PHYSICAL EXAM===================  General:              Awake, alert, in mild  distress due to pain  Neuro:               Moving all extremities to commands. No focal deficits	  HEENT:                           PRASHANTH  Respiratory:	Lungs clear on auscultation bilaterally with good aeration.                           No rales, rhonchi, no wheezing. Effort even and unlabored.                          Right chest tube site - clean  CV:		Regular rate and rhythm. Normal S1/S2. No murmurs  Abdomen:         Soft,  nontender, not-distended. Bowel sounds present  Skin:		No rash.  Extremities:	Warm, no cyanosis or edema.  Palpable pulses    ============================ LABS =======================                      ===================== IMAGING STUDIES ===================  Radiology personally reviewed.    ====================ASSESSMENT AND PLAN ================  61y/o female with RLL lung mass  now is s/p  02/15/18 Thoracoscopic segmentectomy,  Right uniport VATS, RLL wedge, RLL superior segmentectomy, lymph node dissection,  Intercostal nerve block   POD # : 0    Issues:    postop pain               right chest tube in place               Hx lung cancer 2010, HTn, dyslipidemia      ====================== NEUROLOGY=======================  Pain control with PCA /Tylenol IV       ==================== RESPIRATORY========================  Pt is on    2        L nasal canula   Comfortable, no evidence of distress.  Incentive spirometry to start when pt's pain is controlled  Monitor chest tube output  Chest tube to  water seal	  Continue bronchodilators, pulmonary toilet  Head of bed elevation to 30-40 degrees  OOB in PM    ====================CARDIOVASCULAR=====================  Continue hemodynamic monitoring/ telemetry  Not on any pressors  Continue cardiovascular / antihypertensive medications    ===================== RENAL ============================  Continue LR 30CC/hr      D/C IVF  Monitor I/Os, BUN/ Cr  and electrolytes  D/C White      Keep White  BPH: Continue Flomax/ Finasteride      ==================== GASTROINTESTINAL===================  On regular diet, tolerating well  Continue GI prophylaxis with Pepcid / Protonix  Continue Zofran / Reglan for nausea - PRN	  NPO    =======================    ENDOCRIN  =====================  Glycemic monitoring  F/S with coverage  ===================HEMATOLOGIC/ONCOLOGIC =============  Monitor chest tube output. No signs of active bleeding.   Follow CBC, coags  in AM  DVT prophylaxis with SCD, sc Heparin    ========================INFECTIOUS DISEASE===============  No signs of infection. Monitor for fever / leukocytosis.  All surgical incision / chest tube  sites look clean  D/C White      Pertinent clinical, laboratory, radiographic, hemodynamic, echocardiographic, respiratory data, microbiologic data and chart were reviewed and analyzed frequently throughout the course of the day and night. GI and DVT prophylaxis, glycemic control, head of bed elevation and skin care issues were addressed.  Patient seen, examined and plan discussed with CT Surgery / CTICU team during rounds.  Pt remains critically ill in imminent risk of  deterioration and requires very careful cardio- pulmonary monitoring and support.    I have spent               minutes of critical care time with this pt between            am/pm    and               am/ pm         minutes spent on total encounter; more than 50% of the visit was spent counseling and/or coordinating care by the attending physician.        DESHAWN Donnelly MD ROCIO AGUAYO          MRN-[id number]    HPI:  63y/o female with RLL lung mass admitted  for flexible bronchoscopy, right video assisted thoracoscopy, possible right thoracotomy, lung resection .  Pt states, "hx of left VATS, upper lobectomy, s/p chemo and radiation 2010.  Cat scan done every yr, 2 yrs ago identified nodule in right lung, f/u ct shows nodule has grown in size."      Procedure:  02/15/18 Thoracoscopic segmentectomy,  Right uniport VATS, RLL wedge, RLL superior segmentectomy, lymph node dissection,  Intercostal nerve block   POD # : 0    Issues:    postop pain               right chest tube in place               Hx lung cancer 2010, HTN, dyslipidemia        Interval/OR Events/ ROS  Pt extubated in the OR post uneventful surgery. Arrived in ICU awake, c/o chest tube site pain; no SOB, no palpitations, no nausea         PAST MEDICAL & SURGICAL HISTORY:  Solitary pulmonary nodule  Hypercholesteremia  Benign Essential Hypertension  S/P lobectomy of lung: left VATS, left upper lobectomy 2010      Allergies  No Known Allergies          ***VITAL SIGNS:  Vital Signs Last 24 Hrs  T(C): 36.7 (15 Feb 2018 08:51), Max: 36.7 (15 Feb 2018 08:51)  T(F): 98.1 (15 Feb 2018 08:51), Max: 98.1 (15 Feb 2018 08:51)  HR: 64 (15 Feb 2018 08:51) (64 - 64)  BP: 96/68 (15 Feb 2018 08:51) (96/68 - 96/68)  BP(mean): --  RR: 16 (15 Feb 2018 08:51) (16 - 16)  SpO2: 98% (15 Feb 2018 08:51) (98% - 98%)    I/Os:   I&O's Detail        CAPILLARY BLOOD GLUCOSE          =======================  MEDICATIONS  ===================  MEDICATIONS  (STANDING):  atorvastatin 20 milliGRAM(s) Oral at bedtime  docusate sodium 100 milliGRAM(s) Oral three times a day  famotidine    Tablet 20 milliGRAM(s) Oral two times a day  heparin  Injectable 5000 Unit(s) SubCutaneous every 8 hours  HYDROmorphone PCA (1 mG/mL) 30 milliLiter(s) PCA Continuous  lactated ringers. 1000 milliLiter(s) (30 mL/Hr) IV Continuous    MEDICATIONS  (PRN):  dexamethasone  Injectable 4 milliGRAM(s) IV Push every 6 hours PRN Nausea, IF ondansetron is ineffective after 30 - 60 minute  ondansetron Injectable 4 milliGRAM(s) IV Push every 6 hours PRN Nausea          =================== PATIENT CARE ACCESS DEVICES ==========  Peripheral IV (+)   Arterial Line	Left / Rad(+)  No White    ======================= PHYSICAL EXAM===================  General:              Awake, alert, in mild  distress due to pain  Neuro:               Moving all extremities to commands. No focal deficits	  HEENT:                           PRASHANTH  Respiratory:	Lungs clear on auscultation bilaterally with good aeration.                           No rales, rhonchi, no wheezing. Effort even and unlabored.                          Right chest tube site - clean  CV:		Regular rate and rhythm. Normal S1/S2. No murmurs  Abdomen:         Soft,  nontender, not-distended. Bowel sounds present  Skin:		No rash.  Extremities:	Warm, no cyanosis or edema.  Palpable pulses    ============================ LABS =======================                      ===================== IMAGING STUDIES ===================  Radiology personally reviewed.    ====================ASSESSMENT AND PLAN ================  63y/o female with RLL lung mass  now is s/p  02/15/18 Thoracoscopic segmentectomy,  Right uniport VATS, RLL wedge, RLL superior segmentectomy, lymph node dissection,  Intercostal nerve block   POD # : 0    Issues:    postop pain               right chest tube in place               Hx lung cancer 2010, HTN, dyslipidemia      ====================== NEUROLOGY=======================  Pain control with PCA /Tylenol IV       ==================== RESPIRATORY========================  Pt is on    2        L nasal canula   Comfortable, no evidence of distress.  Incentive spirometry to start when pt's pain is controlled  Monitor chest tube output  Chest tube to  water seal	  Continue bronchodilators, pulmonary toilet  Head of bed elevation to 30-40 degrees  OOB in PM    ====================CARDIOVASCULAR=====================  Continue hemodynamic monitoring/ telemetry  Not on any pressors  Continue cardiovascular / antihypertensive medications    ===================== RENAL ============================  Continue LR 30CC/hr        Monitor I/Os, BUN/ Cr  and electrolytes  Voiding trial in progress ( no White cath)      ==================== GASTROINTESTINAL===================  NPO until fully awake post anesthesia and comfortable then will start clears  Continue GI prophylaxis with Pepcid   Zofran / Reglan for nausea - PRN	      =======================    ENDOCRIN  =====================  Glycemic monitoring  F/S with coverage  ===================HEMATOLOGIC/ONCOLOGIC =============  Monitor chest tube output. No signs of active bleeding.   Follow CBC, coags  in AM  DVT prophylaxis with SCD, sc Heparin    ========================INFECTIOUS DISEASE===============  No signs of infection. Monitor for fever / leukocytosis.  All surgical incision / chest tube  sites look clean        Pertinent clinical, laboratory, radiographic, hemodynamic, echocardiographic, respiratory data, microbiologic data and chart were reviewed and analyzed frequently throughout the course of the day and night. GI and DVT prophylaxis, glycemic control, head of bed elevation and skin care issues were addressed.  Patient seen, examined and plan discussed with CT Surgery / CTICU team during rounds.  Pt remains critically ill in imminent risk of  deterioration and requires very careful cardio- pulmonary monitoring and support.    I have spent     35          minutes of critical care time with this pt between      pm    and               am/ pm         minutes spent on total encounter; more than 50% of the visit was spent counseling and/or coordinating care by the attending physician.        DESHAWN Donnelly MD ROCIO AGUAYO          MRN-[id number]    HPI:  61y/o female with RLL lung mass admitted  for flexible bronchoscopy, right video assisted thoracoscopy, possible right thoracotomy, lung resection .  Pt states, "hx of left VATS, upper lobectomy, s/p chemo and radiation 2010.  Cat scan done every yr, 2 yrs ago identified nodule in right lung, f/u ct shows nodule has grown in size."      Procedure:  02/15/18 Thoracoscopic segmentectomy,  Right uniport VATS, RLL wedge, RLL superior segmentectomy, lymph node dissection,  Intercostal nerve block   POD # : 0    Issues:    postop pain               right chest tube in place               Hx lung cancer 2010, HTN, dyslipidemia        Interval/OR Events/ ROS  Pt extubated in the OR post uneventful surgery. Arrived in ICU awake, c/o chest tube site pain; no SOB, no palpitations, no nausea         PAST MEDICAL & SURGICAL HISTORY:  Solitary pulmonary nodule  Hypercholesteremia  Benign Essential Hypertension  S/P lobectomy of lung: left VATS, left upper lobectomy 2010      Allergies  No Known Allergies          ***VITAL SIGNS:  Vital Signs Last 24 Hrs  T(C): 36.7 (15 Feb 2018 08:51), Max: 36.7 (15 Feb 2018 08:51)  T(F): 98.1 (15 Feb 2018 08:51), Max: 98.1 (15 Feb 2018 08:51)  HR: 64 (15 Feb 2018 08:51) (64 - 64)  BP: 96/68 (15 Feb 2018 08:51) (96/68 - 96/68)  BP(mean): --  RR: 16 (15 Feb 2018 08:51) (16 - 16)  SpO2: 98% (15 Feb 2018 08:51) (98% - 98%)    I/Os:   I&O's Detail        CAPILLARY BLOOD GLUCOSE          =======================  MEDICATIONS  ===================  MEDICATIONS  (STANDING):  atorvastatin 20 milliGRAM(s) Oral at bedtime  docusate sodium 100 milliGRAM(s) Oral three times a day  famotidine    Tablet 20 milliGRAM(s) Oral two times a day  heparin  Injectable 5000 Unit(s) SubCutaneous every 8 hours  HYDROmorphone PCA (1 mG/mL) 30 milliLiter(s) PCA Continuous  lactated ringers. 1000 milliLiter(s) (30 mL/Hr) IV Continuous    MEDICATIONS  (PRN):  dexamethasone  Injectable 4 milliGRAM(s) IV Push every 6 hours PRN Nausea, IF ondansetron is ineffective after 30 - 60 minute  ondansetron Injectable 4 milliGRAM(s) IV Push every 6 hours PRN Nausea          =================== PATIENT CARE ACCESS DEVICES ==========  Peripheral IV (+)   Arterial Line	Left / Rad(+)  No White    ======================= PHYSICAL EXAM===================  General:              Awake, alert, in mild  distress due to pain  Neuro:               Moving all extremities to commands. No focal deficits	  HEENT:                           PRASHANTH  Respiratory:	Lungs clear on auscultation bilaterally with good aeration.                           No rales, rhonchi, no wheezing. Effort even and unlabored.                          Right chest tube site - clean  CV:		Regular rate and rhythm. Normal S1/S2. No murmurs  Abdomen:         Soft,  nontender, not-distended. Bowel sounds present  Skin:		No rash.  Extremities:	Warm, no cyanosis or edema.  Palpable pulses    ============================ LABS =======================                          13.2   15.97 )-----------( 214      ( 15 Feb 2018 14:40 )             39.2       ABG - ( 15 Feb 2018 14:40 )      pH: 7.38  /  pCO2: 50    /  pO2: 162   / HCO3: 28    / Base Excess: 4.4   /  SaO2: 99                                ===================== IMAGING STUDIES ===================  Radiology personally reviewed.    ====================ASSESSMENT AND PLAN ================  61y/o female with RLL lung mass  now is s/p  02/15/18 Thoracoscopic segmentectomy,  Right uniport VATS, RLL wedge, RLL superior segmentectomy, lymph node dissection,  Intercostal nerve block   POD # : 0    Issues:    postop pain               right chest tube in place               Hx lung cancer 2010, HTN, dyslipidemia      ====================== NEUROLOGY=======================  Pain control with PCA /Tylenol IV       ==================== RESPIRATORY========================  Pt is on    2        L nasal canula   Comfortable, no evidence of distress.  Incentive spirometry to start when pt's pain is controlled  Monitor chest tube output  Chest tube to  water seal	  Continue bronchodilators, pulmonary toilet  Head of bed elevation to 30-40 degrees  OOB in PM    ====================CARDIOVASCULAR=====================  Continue hemodynamic monitoring/ telemetry  Not on any pressors  Continue cardiovascular / antihypertensive medications    ===================== RENAL ============================  Continue LR 30CC/hr        Monitor I/Os, BUN/ Cr  and electrolytes  Voiding trial in progress ( no White cath)      ==================== GASTROINTESTINAL===================  NPO until fully awake post anesthesia and comfortable then will start clears  Continue GI prophylaxis with Pepcid   Zofran / Reglan for nausea - PRN	      =======================    ENDOCRIN  =====================  Glycemic monitoring  F/S with coverage  ===================HEMATOLOGIC/ONCOLOGIC =============  Monitor chest tube output. No signs of active bleeding.   Follow CBC, coags  in AM  DVT prophylaxis with SCD, sc Heparin    ========================INFECTIOUS DISEASE===============  No signs of infection. Monitor for fever / leukocytosis.  All surgical incision / chest tube  sites look clean        Pertinent clinical, laboratory, radiographic, hemodynamic, echocardiographic, respiratory data, microbiologic data and chart were reviewed and analyzed frequently throughout the course of the day and night. GI and DVT prophylaxis, glycemic control, head of bed elevation and skin care issues were addressed.  Patient seen, examined and plan discussed with CT Surgery / CTICU team during rounds.  Pt remains critically ill in imminent risk of  deterioration and requires very careful cardio- pulmonary monitoring and support.    I have spent     35          minutes of critical care time with this pt between      pm    and               am/ pm         minutes spent on total encounter; more than 50% of the visit was spent counseling and/or coordinating care by the attending physician.        DESHAWN Donnelly MD ROCIO AGUAYO          MRN-[id number]    HPI:  63y/o female with RLL lung mass admitted  for flexible bronchoscopy, right video assisted thoracoscopy, possible right thoracotomy, lung resection .  Pt states, "hx of left VATS, upper lobectomy, s/p chemo and radiation 2010.  Cat scan done every yr, 2 yrs ago identified nodule in right lung, f/u ct shows nodule has grown in size."      Procedure:  02/15/18 Thoracoscopic segmentectomy,  Right uniport VATS, RLL wedge, RLL superior segmentectomy, lymph node dissection,  Intercostal nerve block   POD # : 0    Issues:    postop pain               right chest tube in place               Hx lung cancer 2010, HTN, dyslipidemia        Interval/OR Events/ ROS  Pt extubated in the OR post uneventful surgery. Arrived in ICU awake, c/o chest tube site pain; no SOB, no palpitations, no nausea         PAST MEDICAL & SURGICAL HISTORY:  Solitary pulmonary nodule  Hypercholesteremia  Benign Essential Hypertension  S/P lobectomy of lung: left VATS, left upper lobectomy 2010      Allergies  No Known Allergies          ***VITAL SIGNS:  Vital Signs Last 24 Hrs  T(C): 36.7 (15 Feb 2018 08:51), Max: 36.7 (15 Feb 2018 08:51)  T(F): 98.1 (15 Feb 2018 08:51), Max: 98.1 (15 Feb 2018 08:51)  HR: 64 (15 Feb 2018 08:51) (64 - 64)  BP: 96/68 (15 Feb 2018 08:51) (96/68 - 96/68)  BP(mean): --  RR: 16 (15 Feb 2018 08:51) (16 - 16)  SpO2: 98% (15 Feb 2018 08:51) (98% - 98%)    I/Os:   I&O's Detail        CAPILLARY BLOOD GLUCOSE          =======================  MEDICATIONS  ===================  MEDICATIONS  (STANDING):  atorvastatin 20 milliGRAM(s) Oral at bedtime  docusate sodium 100 milliGRAM(s) Oral three times a day  famotidine    Tablet 20 milliGRAM(s) Oral two times a day  heparin  Injectable 5000 Unit(s) SubCutaneous every 8 hours  HYDROmorphone PCA (1 mG/mL) 30 milliLiter(s) PCA Continuous  lactated ringers. 1000 milliLiter(s) (30 mL/Hr) IV Continuous    MEDICATIONS  (PRN):  dexamethasone  Injectable 4 milliGRAM(s) IV Push every 6 hours PRN Nausea, IF ondansetron is ineffective after 30 - 60 minute  ondansetron Injectable 4 milliGRAM(s) IV Push every 6 hours PRN Nausea          =================== PATIENT CARE ACCESS DEVICES ==========  Peripheral IV (+)   Arterial Line	Left / Rad(+)  No White    ======================= PHYSICAL EXAM===================  General:              Awake, alert, in mild  distress due to pain  Neuro:               Moving all extremities to commands. No focal deficits	  HEENT:                           PRASHANTH  Respiratory:	Lungs clear on auscultation bilaterally with good aeration.                           No rales, rhonchi, no wheezing. Effort even and unlabored.                          Right chest tube site - clean  CV:		Regular rate and rhythm. Normal S1/S2. No murmurs  Abdomen:         Soft,  nontender, not-distended. Bowel sounds present  Skin:		No rash.  Extremities:	Warm, no cyanosis or edema.  Palpable pulses    ============================ LABS =======================                          13.2   15.97 )-----------( 214      ( 15 Feb 2018 14:40 )             39.2     02-15    143       |  102  |  18  ----------------------------<  116<H>  3.3<L>   |  29  |  0.99    Ca    8.7      15 Feb 2018 14:40  Phos  3.1     02-15  Mg     2.0     02-15    TPro  6.6  /  Alb  4.1  /  TBili  0.3  /  DBili  x   /  AST  18  /  ALT  11  /  AlkPhos  63  02-15      ABG - ( 15 Feb 2018 14:40 )      pH: 7.38  /  pCO2: 50    /  pO2: 162   / HCO3: 28    / Base Excess: 4.4   /  SaO2: 99                ===================== IMAGING STUDIES ===================  Radiology personally reviewed.    ====================ASSESSMENT AND PLAN ================  63y/o female with RLL lung mass  now is s/p  02/15/18 Thoracoscopic segmentectomy,  Right uniport VATS, RLL wedge, RLL superior segmentectomy, lymph node dissection,  Intercostal nerve block   POD # : 0    Issues:    postop pain               right chest tube in place               hypokalemia - supplemented               Hx lung cancer 2010, HTN, dyslipidemia      ====================== NEUROLOGY=======================  Pain control with PCA /Tylenol IV       ==================== RESPIRATORY========================  Pt is on    2        L nasal canula   Comfortable, no evidence of distress.  Incentive spirometry to start when pt's pain is controlled  Monitor chest tube output  Chest tube to  water seal	  Continue bronchodilators, pulmonary toilet  Head of bed elevation to 30-40 degrees  OOB in PM    ====================CARDIOVASCULAR=====================  Continue hemodynamic monitoring/ telemetry  Not on any pressors  Continue cardiovascular / antihypertensive medications    ===================== RENAL ============================  Continue LR 30CC/hr        Monitor I/Os, BUN/ Cr  and electrolytes  Voiding trial in progress ( no White cath)      ==================== GASTROINTESTINAL===================  NPO until fully awake post anesthesia and comfortable then will start clears  Continue GI prophylaxis with Pepcid   Zofran / Reglan for nausea - PRN	      =======================    ENDOCRIN  =====================  Glycemic monitoring  F/S with coverage  ===================HEMATOLOGIC/ONCOLOGIC =============  Monitor chest tube output. No signs of active bleeding.   Follow CBC, coags  in AM  DVT prophylaxis with SCD, sc Heparin    ========================INFECTIOUS DISEASE===============  No signs of infection. Monitor for fever / leukocytosis.  All surgical incision / chest tube  sites look clean        Pertinent clinical, laboratory, radiographic, hemodynamic, echocardiographic, respiratory data, microbiologic data and chart were reviewed and analyzed frequently throughout the course of the day and night. GI and DVT prophylaxis, glycemic control, head of bed elevation and skin care issues were addressed.  Patient seen, examined and plan discussed with CT Surgery / CTICU team during rounds.  Pt remains critically ill in imminent risk of  deterioration and requires very careful cardio- pulmonary monitoring and support.    I have spent     35          minutes of critical care time with this pt between      pm    and               am/ pm         minutes spent on total encounter; more than 50% of the visit was spent counseling and/or coordinating care by the attending physician.        DESHAWN Donnelly MD ROCIO AGUAYO          MRN-[id number]    HPI:  63y/o female with RLL lung mass admitted  for flexible bronchoscopy, right video assisted thoracoscopy, possible right thoracotomy, lung resection .  Pt states, "hx of left VATS, upper lobectomy, s/p chemo and radiation 2010.  Cat scan done every yr, 2 yrs ago identified nodule in right lung, f/u ct shows nodule has grown in size."      Procedure:  02/15/18 Thoracoscopic segmentectomy,  Right uniport VATS, RLL wedge, RLL superior segmentectomy, lymph node dissection,  Intercostal nerve block   POD # : 0    Issues:    postop pain               right chest tube in place               Hx lung cancer 2010, HTN, dyslipidemia        Interval/OR Events/ ROS  Pt extubated in the OR post uneventful surgery. Arrived in ICU awake, c/o chest tube site pain; no SOB, no palpitations, no nausea         PAST MEDICAL & SURGICAL HISTORY:  Solitary pulmonary nodule  Hypercholesteremia  Benign Essential Hypertension  S/P lobectomy of lung: left VATS, left upper lobectomy 2010      Allergies  No Known Allergies          ***VITAL SIGNS:  Vital Signs Last 24 Hrs  T(C): 36.7 (15 Feb 2018 08:51), Max: 36.7 (15 Feb 2018 08:51)  T(F): 98.1 (15 Feb 2018 08:51), Max: 98.1 (15 Feb 2018 08:51)  HR: 64 (15 Feb 2018 08:51) (64 - 64)  BP: 96/68 (15 Feb 2018 08:51) (96/68 - 96/68)  BP(mean): --  RR: 16 (15 Feb 2018 08:51) (16 - 16)  SpO2: 98% (15 Feb 2018 08:51) (98% - 98%)        I&O's Detail    15 Feb 2018 07:01  -  15 Feb 2018 15:34  --------------------------------------------------------  IN:    lactated ringers.: 60 mL  Total IN: 60 mL    OUT:  Total OUT: 0 mL    Total NET: 60 mL            CAPILLARY BLOOD GLUCOSE          =======================  MEDICATIONS  ===================  MEDICATIONS  (STANDING):  atorvastatin 20 milliGRAM(s) Oral at bedtime  docusate sodium 100 milliGRAM(s) Oral three times a day  famotidine    Tablet 20 milliGRAM(s) Oral two times a day  heparin  Injectable 5000 Unit(s) SubCutaneous every 8 hours  HYDROmorphone PCA (1 mG/mL) 30 milliLiter(s) PCA Continuous  lactated ringers. 1000 milliLiter(s) (30 mL/Hr) IV Continuous    MEDICATIONS  (PRN):  dexamethasone  Injectable 4 milliGRAM(s) IV Push every 6 hours PRN Nausea, IF ondansetron is ineffective after 30 - 60 minute  ondansetron Injectable 4 milliGRAM(s) IV Push every 6 hours PRN Nausea          =================== PATIENT CARE ACCESS DEVICES ==========  Peripheral IV (+)   Arterial Line	Left / Rad(+)  No White    ======================= PHYSICAL EXAM===================  General:              Awake, alert, in mild  distress due to pain  Neuro:               Moving all extremities to commands. No focal deficits	  HEENT:                           PRASHANTH  Respiratory:	Lungs clear on auscultation bilaterally with good aeration.                           No rales, rhonchi, no wheezing. Effort even and unlabored.                          Right chest tube site - clean  CV:		Regular rate and rhythm. Normal S1/S2. No murmurs  Abdomen:         Soft,  nontender, not-distended. Bowel sounds present  Skin:		No rash.  Extremities:	Warm, no cyanosis or edema.  Palpable pulses    ============================ LABS =======================                          13.2   15.97 )-----------( 214      ( 15 Feb 2018 14:40 )             39.2     02-15    143       |  102  |  18  ----------------------------<  116<H>  3.3<L>   |  29  |  0.99    Ca    8.7      15 Feb 2018 14:40  Phos  3.1     02-15  Mg     2.0     02-15    TPro  6.6  /  Alb  4.1  /  TBili  0.3  /  DBili  x   /  AST  18  /  ALT  11  /  AlkPhos  63  02-15      ABG - ( 15 Feb 2018 14:40 )      pH: 7.38  /  pCO2: 50    /  pO2: 162   / HCO3: 28    / Base Excess: 4.4   /  SaO2: 99                ===================== IMAGING STUDIES ===================  Radiology personally reviewed.    ====================ASSESSMENT AND PLAN ================  63y/o female with RLL lung mass  now is s/p  02/15/18 Thoracoscopic segmentectomy,  Right uniport VATS, RLL wedge, RLL superior segmentectomy, lymph node dissection,  Intercostal nerve block   POD # : 0    Issues:    postop pain               right chest tube in place               hypokalemia - supplemented               Hx lung cancer 2010, HTN, dyslipidemia      ====================== NEUROLOGY=======================  Pain control with PCA /Tylenol IV       ==================== RESPIRATORY========================  Pt is on    2        L nasal canula   Comfortable, no evidence of distress.  Incentive spirometry to start when pt's pain is controlled  Monitor chest tube output  Chest tube to  water seal	  Continue bronchodilators, pulmonary toilet  Head of bed elevation to 30-40 degrees  OOB in PM    ====================CARDIOVASCULAR=====================  Continue hemodynamic monitoring/ telemetry  Not on any pressors  Continue cardiovascular / antihypertensive medications    ===================== RENAL ============================  Continue LR 30CC/hr        Monitor I/Os, BUN/ Cr  and electrolytes  Voiding trial in progress ( no White cath)      ==================== GASTROINTESTINAL===================  NPO until fully awake post anesthesia and comfortable then will start clears  Continue GI prophylaxis with Pepcid   Zofran / Reglan for nausea - PRN	      =======================    ENDOCRIN  =====================  Glycemic monitoring  F/S with coverage  ===================HEMATOLOGIC/ONCOLOGIC =============  Monitor chest tube output. No signs of active bleeding.   Follow CBC, coags  in AM  DVT prophylaxis with SCD, sc Heparin    ========================INFECTIOUS DISEASE===============  No signs of infection. Monitor for fever / leukocytosis.  All surgical incision / chest tube  sites look clean        Pertinent clinical, laboratory, radiographic, hemodynamic, echocardiographic, respiratory data, microbiologic data and chart were reviewed and analyzed frequently throughout the course of the day and night. GI and DVT prophylaxis, glycemic control, head of bed elevation and skin care issues were addressed.  Patient seen, examined and plan discussed with CT Surgery / CTICU team during rounds.  Pt remains critically ill in imminent risk of  deterioration and requires very careful cardio- pulmonary monitoring and support.    I have spent     35          minutes of critical care time with this pt between      pm    and               am/ pm         minutes spent on total encounter; more than 50% of the visit was spent counseling and/or coordinating care by the attending physician.        DESHAWN Donnelly MD ROCIO AGUAYO          MRN-[id number]    HPI:  61y/o female with RLL lung mass admitted  for flexible bronchoscopy, right video assisted thoracoscopy, possible right thoracotomy, lung resection .  Pt states, "hx of left VATS, upper lobectomy, s/p chemo and radiation 2010.  Cat scan done every yr, 2 yrs ago identified nodule in right lung, f/u ct shows nodule has grown in size."      Procedure:  02/15/18 Thoracoscopic segmentectomy,  Right uniport VATS, RLL wedge, RLL superior segmentectomy, lymph node dissection,  Intercostal nerve block   POD # : 0    Issues:    postop pain               right chest tube in place               Hx lung cancer 2010, HTN, dyslipidemia        Interval/OR Events/ ROS  Pt extubated in the OR post uneventful surgery. Arrived in ICU awake, c/o chest tube site pain; no SOB, no palpitations, no nausea         PAST MEDICAL & SURGICAL HISTORY:  Solitary pulmonary nodule  Hypercholesteremia  Benign Essential Hypertension  S/P lobectomy of lung: left VATS, left upper lobectomy 2010      Allergies  No Known Allergies          ***VITAL SIGNS:  Vital Signs Last 24 Hrs  T(C): 36.7 (15 Feb 2018 08:51), Max: 36.7 (15 Feb 2018 08:51)  T(F): 98.1 (15 Feb 2018 08:51), Max: 98.1 (15 Feb 2018 08:51)  HR: 64 (15 Feb 2018 08:51) (64 - 64)  BP: 96/68 (15 Feb 2018 08:51) (96/68 - 96/68)  BP(mean): --  RR: 16 (15 Feb 2018 08:51) (16 - 16)  SpO2: 98% (15 Feb 2018 08:51) (98% - 98%)        I&O's Detail      15 Feb 2018 07:01  -  15 Feb 2018 16:54  --------------------------------------------------------  IN:    IV PiggyBack: 100 mL    lactated ringers.: 90 mL  Total IN: 190 mL    OUT:  Total OUT: 0 mL    Total NET: 190 mL                  CAPILLARY BLOOD GLUCOSE          =======================  MEDICATIONS  ===================  MEDICATIONS  (STANDING):  atorvastatin 20 milliGRAM(s) Oral at bedtime  docusate sodium 100 milliGRAM(s) Oral three times a day  famotidine    Tablet 20 milliGRAM(s) Oral two times a day  heparin  Injectable 5000 Unit(s) SubCutaneous every 8 hours  HYDROmorphone PCA (1 mG/mL) 30 milliLiter(s) PCA Continuous  lactated ringers. 1000 milliLiter(s) (30 mL/Hr) IV Continuous    MEDICATIONS  (PRN):  dexamethasone  Injectable 4 milliGRAM(s) IV Push every 6 hours PRN Nausea, IF ondansetron is ineffective after 30 - 60 minute  ondansetron Injectable 4 milliGRAM(s) IV Push every 6 hours PRN Nausea          =================== PATIENT CARE ACCESS DEVICES ==========  Peripheral IV (+)   Arterial Line	Left / Rad(+)  No White    ======================= PHYSICAL EXAM===================  General:              Awake, alert, in mild  distress due to pain  Neuro:               Moving all extremities to commands. No focal deficits	  HEENT:                           PRASHANTH  Respiratory:	Lungs clear on auscultation bilaterally with good aeration.                           No rales, rhonchi, no wheezing. Effort even and unlabored.                          Right chest tube site - clean  CV:		Regular rate and rhythm. Normal S1/S2. No murmurs  Abdomen:         Soft,  nontender, not-distended. Bowel sounds present  Skin:		No rash.  Extremities:	Warm, no cyanosis or edema.  Palpable pulses    ============================ LABS =======================                          13.2   15.97 )-----------( 214      ( 15 Feb 2018 14:40 )             39.2     02-15    143       |  102  |  18  ----------------------------<  116<H>  3.3<L>   |  29  |  0.99    Ca    8.7      15 Feb 2018 14:40  Phos  3.1     02-15  Mg     2.0     02-15    TPro  6.6  /  Alb  4.1  /  TBili  0.3  /  DBili  x   /  AST  18  /  ALT  11  /  AlkPhos  63  02-15      ABG - ( 15 Feb 2018 14:40 )      pH: 7.38  /  pCO2: 50    /  pO2: 162   / HCO3: 28    / Base Excess: 4.4   /  SaO2: 99                ===================== IMAGING STUDIES ===================  Radiology personally reviewed.    ====================ASSESSMENT AND PLAN ================  61y/o female with RLL lung mass  now is s/p  02/15/18 Thoracoscopic segmentectomy,  Right uniport VATS, RLL wedge, RLL superior segmentectomy, lymph node dissection,  Intercostal nerve block   POD # : 0    Issues:    postop pain               right chest tube in place               hypokalemia - supplemented               Hx lung cancer 2010, HTN, dyslipidemia      ====================== NEUROLOGY=======================  Pain control with PCA /Tylenol IV       ==================== RESPIRATORY========================  Pt is on    2        L nasal canula   Comfortable, no evidence of distress.  Incentive spirometry to start when pt's pain is controlled  Monitor chest tube output  Chest tube to  water seal	  Continue bronchodilators, pulmonary toilet  Head of bed elevation to 30-40 degrees  OOB in PM    ====================CARDIOVASCULAR=====================  Continue hemodynamic monitoring/ telemetry  Not on any pressors  Continue cardiovascular / antihypertensive medications    ===================== RENAL ============================  Continue LR 30CC/hr        Monitor I/Os, BUN/ Cr  and electrolytes  Voiding trial in progress ( no White cath)      ==================== GASTROINTESTINAL===================  NPO until fully awake post anesthesia and comfortable then will start clears  Continue GI prophylaxis with Pepcid   Zofran / Reglan for nausea - PRN	      =======================    ENDOCRIN  =====================  Glycemic monitoring  F/S with coverage  ===================HEMATOLOGIC/ONCOLOGIC =============  Monitor chest tube output. No signs of active bleeding.   Follow CBC, coags  in AM  DVT prophylaxis with SCD, sc Heparin    ========================INFECTIOUS DISEASE===============  No signs of infection. Monitor for fever / leukocytosis.  All surgical incision / chest tube  sites look clean        Pertinent clinical, laboratory, radiographic, hemodynamic, echocardiographic, respiratory data, microbiologic data and chart were reviewed and analyzed frequently throughout the course of the day and night. GI and DVT prophylaxis, glycemic control, head of bed elevation and skin care issues were addressed.  Patient seen, examined and plan discussed with CT Surgery / CTICU team during rounds.  Pt remains critically ill in imminent risk of  deterioration and requires very careful cardio- pulmonary monitoring and support.    I have spent     35          minutes of critical care time with this pt between      pm    and               am/ pm         minutes spent on total encounter; more than 50% of the visit was spent counseling and/or coordinating care by the attending physician.        DESHAWN Donnelly MD ROCIO AGUAYO          MRN-[id number]    HPI:  61y/o female with RLL lung mass admitted  for flexible bronchoscopy, right video assisted thoracoscopy, possible right thoracotomy, lung resection .  Pt states, "hx of left VATS, upper lobectomy, s/p chemo and radiation 2010.  Cat scan done every yr, 2 yrs ago identified nodule in right lung, f/u ct shows nodule has grown in size."      Procedure:  02/15/18 Thoracoscopic segmentectomy,  Right uniport VATS, RLL wedge, RLL superior segmentectomy, lymph node dissection,  Intercostal nerve block   POD # : 0    Issues:    postop pain               right chest tube in place               Hx lung cancer 2010, HTN, dyslipidemia        Interval/OR Events/ ROS  Pt extubated in the OR post uneventful surgery. Arrived in ICU awake, c/o chest tube site pain; no SOB, no palpitations, no nausea         PAST MEDICAL & SURGICAL HISTORY:  Solitary pulmonary nodule  Hypercholesteremia  Benign Essential Hypertension  S/P lobectomy of lung: left VATS, left upper lobectomy 2010      Allergies  No Known Allergies          ***VITAL SIGNS:  Vital Signs Last 24 Hrs  T(C): 36.7 (15 Feb 2018 08:51), Max: 36.7 (15 Feb 2018 08:51)  T(F): 98.1 (15 Feb 2018 08:51), Max: 98.1 (15 Feb 2018 08:51)  HR: 64 (15 Feb 2018 08:51) (64 - 64)  BP: 96/68 (15 Feb 2018 08:51) (96/68 - 96/68)  BP(mean): --  RR: 16 (15 Feb 2018 08:51) (16 - 16)  SpO2: 98% (15 Feb 2018 08:51) (98% - 98%)        I&O's Detail      15 Feb 2018 07:01  -  15 Feb 2018 16:54  --------------------------------------------------------  IN:    IV PiggyBack: 100 mL    lactated ringers.: 90 mL  Total IN: 190 mL    OUT:  Total OUT: 0 mL    Total NET: 190 mL                  CAPILLARY BLOOD GLUCOSE          =======================  MEDICATIONS  ===================  MEDICATIONS  (STANDING):  atorvastatin 20 milliGRAM(s) Oral at bedtime  docusate sodium 100 milliGRAM(s) Oral three times a day  famotidine    Tablet 20 milliGRAM(s) Oral two times a day  heparin  Injectable 5000 Unit(s) SubCutaneous every 8 hours  HYDROmorphone PCA (1 mG/mL) 30 milliLiter(s) PCA Continuous  lactated ringers. 1000 milliLiter(s) (30 mL/Hr) IV Continuous    MEDICATIONS  (PRN):  dexamethasone  Injectable 4 milliGRAM(s) IV Push every 6 hours PRN Nausea, IF ondansetron is ineffective after 30 - 60 minute  ondansetron Injectable 4 milliGRAM(s) IV Push every 6 hours PRN Nausea          =================== PATIENT CARE ACCESS DEVICES ==========  Peripheral IV (+)   Arterial Line	Left / Rad(+)  No White    ======================= PHYSICAL EXAM===================  General:              Awake, alert, in mild  distress due to pain  Neuro:               Moving all extremities to commands. No focal deficits	  HEENT:                           PRASHANTH  Respiratory:	Lungs clear on auscultation bilaterally with good aeration.                           No rales, rhonchi, no wheezing. Effort even and unlabored.                          Right chest tube site - clean  CV:		Regular rate and rhythm. Normal S1/S2. No murmurs  Abdomen:         Soft,  nontender, not-distended. Bowel sounds present  Skin:		No rash.  Extremities:	Warm, no cyanosis or edema.  Palpable pulses    ============================ LABS =======================                          13.2   15.97 )-----------( 214      ( 15 Feb 2018 14:40 )             39.2     02-15    143       |  102  |  18  ----------------------------<  116<H>  3.3<L>   |  29  |  0.99    Ca    8.7      15 Feb 2018 14:40  Phos  3.1     02-15  Mg     2.0     02-15    TPro  6.6  /  Alb  4.1  /  TBili  0.3  /  DBili  x   /  AST  18  /  ALT  11  /  AlkPhos  63  02-15      ABG - ( 15 Feb 2018 14:40 )      pH: 7.38  /  pCO2: 50    /  pO2: 162   / HCO3: 28    / Base Excess: 4.4   /  SaO2: 99                ===================== IMAGING STUDIES ===================  Radiology personally reviewed.  CXR - no ETT, small right apical PTX; right chest tube in place. Old postsurgical left midlung density with fibrotic change. No effusion, no gross infiltrated        ====================ASSESSMENT AND PLAN ================  61y/o female with RLL lung mass  now is s/p  02/15/18 Thoracoscopic segmentectomy,  Right uniport VATS, RLL wedge, RLL superior segmentectomy, lymph node dissection,  Intercostal nerve block   POD # : 0    Issues:    postop pain               right chest tube in place               hypokalemia - supplemented               Hx lung cancer 2010, HTN, dyslipidemia      ====================== NEUROLOGY=======================  Pain control with PCA /Tylenol IV       ==================== RESPIRATORY========================  Pt is on    2        L nasal canula   Comfortable, no evidence of distress.  Incentive spirometry to start when pt's pain is controlled  Monitor chest tube output  Chest tube to  water seal	  Continue bronchodilators, pulmonary toilet  Head of bed elevation to 30-40 degrees  OOB in PM    ====================CARDIOVASCULAR=====================  Continue hemodynamic monitoring/ telemetry  Not on any pressors  Continue cardiovascular / antihypertensive medications    ===================== RENAL ============================  Continue LR 30CC/hr        Monitor I/Os, BUN/ Cr  and electrolytes  Voiding trial in progress ( no White cath)      ==================== GASTROINTESTINAL===================  NPO until fully awake post anesthesia and comfortable then will start clears  Continue GI prophylaxis with Pepcid   Zofran / Reglan for nausea - PRN	      =======================    ENDOCRIN  =====================  Glycemic monitoring  F/S with coverage  ===================HEMATOLOGIC/ONCOLOGIC =============  Monitor chest tube output. No signs of active bleeding.   Follow CBC, coags  in AM  DVT prophylaxis with SCD, sc Heparin    ========================INFECTIOUS DISEASE===============  No signs of infection. Monitor for fever / leukocytosis.  All surgical incision / chest tube  sites look clean  F/up AFB's and Quantiferon test  Respiratory isolation      Pertinent clinical, laboratory, radiographic, hemodynamic, echocardiographic, respiratory data, microbiologic data and chart were reviewed and analyzed frequently throughout the course of the day and night. GI and DVT prophylaxis, glycemic control, head of bed elevation and skin care issues were addressed.  Patient seen, examined and plan discussed with CT Surgery / CTICU team during rounds.  Pt remains critically ill in imminent risk of  deterioration and requires very careful cardio- pulmonary monitoring and support.    I have spent     35          minutes of critical care time with this pt between  1:30    pm    and  11:55 pm         minutes spent on total encounter; more than 50% of the visit was spent counseling and/or coordinating care by the attending physician.        DESHAWN Donnelly MD ROCIO AGUAYO          MRN-[id number]    HPI:  63y/o female with RLL lung mass admitted  for flexible bronchoscopy, right video assisted thoracoscopy, possible right thoracotomy, lung resection .  Pt states, "hx of left VATS, upper lobectomy, s/p chemo and radiation 2010.  Cat scan done every yr, 2 yrs ago identified nodule in right lung, f/u ct shows nodule has grown in size."      Procedure:  02/15/18 Thoracoscopic segmentectomy,  Right uniport VATS, RLL wedge, RLL superior segmentectomy, lymph node dissection,  Intercostal nerve block   POD # : 0    Issues:    postop pain               right chest tube in place               Hx lung cancer 2010, HTN, dyslipidemia        Interval/OR Events/ ROS  Pt extubated in the OR post uneventful surgery. Arrived in ICU awake, c/o chest tube site pain; no SOB, no palpitations, no nausea         PAST MEDICAL & SURGICAL HISTORY:  Solitary pulmonary nodule  Hypercholesteremia  Benign Essential Hypertension  S/P lobectomy of lung: left VATS, left upper lobectomy 2010          Home Medications:   * Patient Currently Takes Medications as of 30-Jan-2018 15:14 documented in Structured Notes  · 	triamterene-hydrochlorothiazide 37.5mg-25mg oral capsule: 1 cap(s) orally once a day in am  · 	atenolol 25 mg oral tablet: 1 tab(s) orally once a day in am  · 	amLODIPine 10 mg oral tablet: 1 tab(s) orally once a day in am  · 	Crestor 5 mg oral tablet: 1 tab(s) orally once a day in am        Allergies  No Known Allergies          ***VITAL SIGNS:  Vital Signs Last 24 Hrs  T(C): 36.7 (15 Feb 2018 08:51), Max: 36.7 (15 Feb 2018 08:51)  T(F): 98.1 (15 Feb 2018 08:51), Max: 98.1 (15 Feb 2018 08:51)  HR: 64 (15 Feb 2018 08:51) (64 - 64)  BP: 96/68 (15 Feb 2018 08:51) (96/68 - 96/68)  BP(mean): --  RR: 16 (15 Feb 2018 08:51) (16 - 16)  SpO2: 98% (15 Feb 2018 08:51) (98% - 98%)        I&O's Detail      15 Feb 2018 07:01  -  15 Feb 2018 16:54  --------------------------------------------------------  IN:    IV PiggyBack: 100 mL    lactated ringers.: 90 mL  Total IN: 190 mL    OUT:  Total OUT: 0 mL    Total NET: 190 mL                  CAPILLARY BLOOD GLUCOSE          =======================  MEDICATIONS  ===================  MEDICATIONS  (STANDING):  atorvastatin 20 milliGRAM(s) Oral at bedtime  docusate sodium 100 milliGRAM(s) Oral three times a day  famotidine    Tablet 20 milliGRAM(s) Oral two times a day  heparin  Injectable 5000 Unit(s) SubCutaneous every 8 hours  HYDROmorphone PCA (1 mG/mL) 30 milliLiter(s) PCA Continuous  lactated ringers. 1000 milliLiter(s) (30 mL/Hr) IV Continuous    MEDICATIONS  (PRN):  dexamethasone  Injectable 4 milliGRAM(s) IV Push every 6 hours PRN Nausea, IF ondansetron is ineffective after 30 - 60 minute  ondansetron Injectable 4 milliGRAM(s) IV Push every 6 hours PRN Nausea          =================== PATIENT CARE ACCESS DEVICES ==========  Peripheral IV (+)   Arterial Line	Left / Rad(+)  No White    ======================= PHYSICAL EXAM===================  General:              Awake, alert, in mild  distress due to pain  Neuro:               Moving all extremities to commands. No focal deficits	  HEENT:                           PRASHANTH  Respiratory:	Lungs clear on auscultation bilaterally with good aeration.                           No rales, rhonchi, no wheezing. Effort even and unlabored.                          Right chest tube site - clean  CV:		Regular rate and rhythm. Normal S1/S2. No murmurs  Abdomen:         Soft,  nontender, not-distended. Bowel sounds present  Skin:		No rash.  Extremities:	Warm, no cyanosis or edema.  Palpable pulses    ============================ LABS =======================                          13.2   15.97 )-----------( 214      ( 15 Feb 2018 14:40 )             39.2     02-15    143       |  102  |  18  ----------------------------<  116<H>  3.3<L>   |  29  |  0.99    Ca    8.7      15 Feb 2018 14:40  Phos  3.1     02-15  Mg     2.0     02-15    TPro  6.6  /  Alb  4.1  /  TBili  0.3  /  DBili  x   /  AST  18  /  ALT  11  /  AlkPhos  63  02-15      ABG - ( 15 Feb 2018 14:40 )      pH: 7.38  /  pCO2: 50    /  pO2: 162   / HCO3: 28    / Base Excess: 4.4   /  SaO2: 99                ===================== IMAGING STUDIES ===================  Radiology personally reviewed.  CXR - no ETT, small right apical PTX; right chest tube in place. Old postsurgical left midlung density with fibrotic change. No effusion, no gross infiltrated        ====================ASSESSMENT AND PLAN ================  63y/o female with RLL lung mass  now is s/p  02/15/18 Thoracoscopic segmentectomy,  Right uniport VATS, RLL wedge, RLL superior segmentectomy, lymph node dissection,  Intercostal nerve block   POD # : 0    Issues:    postop pain               right chest tube in place               hypokalemia - supplemented               Hx lung cancer 2010, HTN, dyslipidemia      ====================== NEUROLOGY=======================  Pain control with PCA /Tylenol IV       ==================== RESPIRATORY========================  Pt is on    2        L nasal canula   Comfortable, no evidence of distress.  Incentive spirometry to start when pt's pain is controlled  Monitor chest tube output  Chest tube to  water seal	  Continue bronchodilators, pulmonary toilet  Head of bed elevation to 30-40 degrees  OOB in PM    ====================CARDIOVASCULAR=====================  Continue hemodynamic monitoring/ telemetry  Not on any pressors  Continue cardiovascular / antihypertensive medications    ===================== RENAL ============================  Continue LR 30CC/hr        Monitor I/Os, BUN/ Cr  and electrolytes  Voiding trial in progress ( no White cath)      ==================== GASTROINTESTINAL===================  NPO until fully awake post anesthesia and comfortable then will start clears  Continue GI prophylaxis with Pepcid   Zofran / Reglan for nausea - PRN	      =======================    ENDOCRIN  =====================  Glycemic monitoring  F/S with coverage  ===================HEMATOLOGIC/ONCOLOGIC =============  Monitor chest tube output. No signs of active bleeding.   Follow CBC, coags  in AM  DVT prophylaxis with SCD, sc Heparin    ========================INFECTIOUS DISEASE===============  No signs of infection. Monitor for fever / leukocytosis.  All surgical incision / chest tube  sites look clean  F/up AFB's and Quantiferon test  Respiratory isolation      Pertinent clinical, laboratory, radiographic, hemodynamic, echocardiographic, respiratory data, microbiologic data and chart were reviewed and analyzed frequently throughout the course of the day and night. GI and DVT prophylaxis, glycemic control, head of bed elevation and skin care issues were addressed.  Patient seen, examined and plan discussed with CT Surgery / CTICU team during rounds.  Pt remains critically ill in imminent risk of  deterioration and requires very careful cardio- pulmonary monitoring and support.    I have spent     35          minutes of critical care time with this pt between  1:30    pm    and  11:55 pm         minutes spent on total encounter; more than 50% of the visit was spent counseling and/or coordinating care by the attending physician.        DESHAWN Donnelly MD ROCIO AGUAYO          MRN-[id number]    HPI:  61y/o female with RLL lung mass admitted  for flexible bronchoscopy, right video assisted thoracoscopy, possible right thoracotomy, lung resection .  Pt states, "hx of left VATS, upper lobectomy, s/p chemo and radiation 2010.  Cat scan done every yr, 2 yrs ago identified nodule in right lung, f/u ct shows nodule has grown in size."      Procedure:  02/15/18 Thoracoscopic segmentectomy,  Right uniport VATS, RLL wedge, RLL superior segmentectomy, lymph node dissection,  Intercostal nerve block   POD # : 0    Issues:    postop pain               right chest tube in place               Hx lung cancer 2010, HTN, dyslipidemia        Interval/OR Events/ ROS  Pt extubated in the OR post uneventful surgery. Arrived in ICU awake, c/o chest tube site pain; no SOB, no palpitations, no nausea         PAST MEDICAL & SURGICAL HISTORY:  Solitary pulmonary nodule  Hypercholesteremia  Benign Essential Hypertension  S/P lobectomy of lung: left VATS, left upper lobectomy 2010          Home Medications:   * Patient Currently Takes Medications as of 30-Jan-2018 15:14 documented in Structured Notes  · 	triamterene-hydrochlorothiazide 37.5mg-25mg oral capsule: 1 cap(s) orally once a day in am  · 	atenolol 25 mg oral tablet: 1 tab(s) orally once a day in am  · 	amLODIPine 10 mg oral tablet: 1 tab(s) orally once a day in am  · 	Crestor 5 mg oral tablet: 1 tab(s) orally once a day in am        Allergies  No Known Allergies          ***VITAL SIGNS:  Vital Signs Last 24 Hrs  T(C): 36.7 (15 Feb 2018 08:51), Max: 36.7 (15 Feb 2018 08:51)  T(F): 98.1 (15 Feb 2018 08:51), Max: 98.1 (15 Feb 2018 08:51)  HR: 64 (15 Feb 2018 08:51) (64 - 64)  BP: 96/68 (15 Feb 2018 08:51) (96/68 - 96/68)  BP(mean): --  RR: 16 (15 Feb 2018 08:51) (16 - 16)  SpO2: 98% (15 Feb 2018 08:51) (98% - 98%)        I&O's Detail      15 Feb 2018 07:01  -  15 Feb 2018 16:54  --------------------------------------------------------  IN:    IV PiggyBack: 100 mL    lactated ringers.: 90 mL  Total IN: 190 mL    OUT:  Total OUT: 0 mL    Total NET: 190 mL                  CAPILLARY BLOOD GLUCOSE          =======================  MEDICATIONS  ===================  MEDICATIONS  (STANDING):  atorvastatin 20 milliGRAM(s) Oral at bedtime  docusate sodium 100 milliGRAM(s) Oral three times a day  famotidine    Tablet 20 milliGRAM(s) Oral two times a day  heparin  Injectable 5000 Unit(s) SubCutaneous every 8 hours  HYDROmorphone PCA (1 mG/mL) 30 milliLiter(s) PCA Continuous  lactated ringers. 1000 milliLiter(s) (30 mL/Hr) IV Continuous    MEDICATIONS  (PRN):  dexamethasone  Injectable 4 milliGRAM(s) IV Push every 6 hours PRN Nausea, IF ondansetron is ineffective after 30 - 60 minute  ondansetron Injectable 4 milliGRAM(s) IV Push every 6 hours PRN Nausea          =================== PATIENT CARE ACCESS DEVICES ==========  Peripheral IV (+)   Arterial Line	Left / Rad(+)  No White    ======================= PHYSICAL EXAM===================  General:              Awake, alert, in mild  distress due to pain  Neuro:               Moving all extremities to commands. No focal deficits	  HEENT:                           PRASHANTH  Respiratory:	Lungs clear on auscultation bilaterally with good aeration.                           No rales, rhonchi, no wheezing. Effort even and unlabored.                          Right chest tube site - clean  CV:		Regular rate and rhythm. Normal S1/S2. No murmurs  Abdomen:         Soft,  nontender, not-distended. Bowel sounds present  Skin:		No rash.  Extremities:	Warm, no cyanosis or edema.  Palpable pulses    ============================ LABS =======================                          13.2   15.97 )-----------( 214      ( 15 Feb 2018 14:40 )             39.2     02-15    143       |  102  |  18  ----------------------------<  116<H>  3.3<L>   |  29  |  0.99    Ca    8.7      15 Feb 2018 14:40  Phos  3.1     02-15  Mg     2.0     02-15    TPro  6.6  /  Alb  4.1  /  TBili  0.3  /  DBili  x   /  AST  18  /  ALT  11  /  AlkPhos  63  02-15      ABG - ( 15 Feb 2018 14:40 )      pH: 7.38  /  pCO2: 50    /  pO2: 162   / HCO3: 28    / Base Excess: 4.4   /  SaO2: 99                ===================== IMAGING STUDIES ===================  Radiology personally reviewed.        < from: Xray Chest 1 View AP/PA (02.15.18 @ 14:19) >  EXAM:  XR CHEST AP OR PA 1V        PROCEDURE DATE:  Feb 15 2018   INTERPRETATION:  TIME OF EXAM: February 15, 2018 at 2:11 PM.    CLINICAL INFORMATION: Status post right VATS. Right lower lobe superior   segmentectomy. Prior history of left upper lobectomy.    COMPARISON:  PET/CT scan from January 29, 2018.    TECHNIQUE:   AP Portable chest x-ray.    INTERPRETATION:     Heart size and the mediastinum cannot be accurately evaluated on this   projection.  There is right lung postsurgical change with chain sutures present.  A right chest tube has its tip in the apex.  This is small to moderate right apical pneumothorax with the superior   pleural edge just above the posterior right fourth rib.  Left lung volume loss with postsurgical change in the left upper chest is   again seen, not severely changed in appearance from the  image from   the PET CT scan. The left mid and lower lung is clear.  No pleural effusions are seen.  The visualized bony structures appear intact.            IMPRESSION:  Right lung postsurgical change with right chest tube in   place.    Small to moderate right apical pneumothorax.    Left lung volume loss with postsurgical change in the upper left chest.        < end of copied text >          ====================ASSESSMENT AND PLAN ================  61y/o female with RLL lung mass  now is s/p  02/15/18 Thoracoscopic segmentectomy,  Right uniport VATS, RLL wedge, RLL superior segmentectomy, lymph node dissection,  Intercostal nerve block   POD # : 0    Issues:    postop pain               right chest tube in place               right apical PTX               hypokalemia - supplemented               Hx lung cancer 2010, HTN, dyslipidemia      ====================== NEUROLOGY=======================  Pain control with PCA /Tylenol IV       ==================== RESPIRATORY========================  Pt is on    2        L nasal canula   Comfortable, no evidence of distress.  Incentive spirometry to start when pt's pain is controlled  Monitor chest tube output  Chest tube to  water seal	  Continue bronchodilators, pulmonary toilet  Head of bed elevation to 30-40 degrees  OOB in PM    ====================CARDIOVASCULAR=====================  Continue hemodynamic monitoring/ telemetry  Not on any pressors  Continue cardiovascular / antihypertensive medications    ===================== RENAL ============================  Continue LR 30CC/hr        Monitor I/Os, BUN/ Cr  and electrolytes  Voiding trial in progress ( no White cath)      ==================== GASTROINTESTINAL===================  NPO until fully awake post anesthesia and comfortable then will start clears  Continue GI prophylaxis with Pepcid   Zofran / Reglan for nausea - PRN	      =======================    ENDOCRIN  =====================  Glycemic monitoring  F/S with coverage  ===================HEMATOLOGIC/ONCOLOGIC =============  Monitor chest tube output. No signs of active bleeding.   Follow CBC, coags  in AM  DVT prophylaxis with SCD, sc Heparin    ========================INFECTIOUS DISEASE===============  No signs of infection. Monitor for fever / leukocytosis.  All surgical incision / chest tube  sites look clean  F/up AFB's and Quantiferon test  Respiratory isolation      Pertinent clinical, laboratory, radiographic, hemodynamic, echocardiographic, respiratory data, microbiologic data and chart were reviewed and analyzed frequently throughout the course of the day and night. GI and DVT prophylaxis, glycemic control, head of bed elevation and skin care issues were addressed.  Patient seen, examined and plan discussed with CT Surgery / CTICU team during rounds.  Pt remains critically ill in imminent risk of  deterioration and requires very careful cardio- pulmonary monitoring and support.    I have spent     45         minutes of critical care time with this pt between  1:30    pm    and  11:55 pm         minutes spent on total encounter; more than 50% of the visit was spent counseling and/or coordinating care by the attending physician.        DESHAWN Donnelly MD ROCIO AGUAYO          MRN-[id number]    HPI:  61y/o female with RLL lung mass admitted  for flexible bronchoscopy, right video assisted thoracoscopy, possible right thoracotomy, lung resection .  Pt states, "hx of left VATS, upper lobectomy, s/p chemo and radiation 2010.  Cat scan done every yr, 2 yrs ago identified nodule in right lung, f/u ct shows nodule has grown in size."      Procedure:  02/15/18 Thoracoscopic segmentectomy,  Right uniport VATS, RLL wedge, RLL superior segmentectomy, lymph node dissection,  Intercostal nerve block   POD # : 0    Issues:    postop pain               right chest tube in place               Hx lung cancer 2010, HTN, dyslipidemia        Interval/OR Events/ ROS  Pt extubated in the OR post uneventful surgery. Arrived in ICU awake, c/o chest tube site pain; no SOB, no palpitations, no nausea         PAST MEDICAL & SURGICAL HISTORY:  Solitary pulmonary nodule  Hypercholesteremia  Benign Essential Hypertension  S/P lobectomy of lung: left VATS, left upper lobectomy 2010          Home Medications:   * Patient Currently Takes Medications as of 30-Jan-2018 15:14 documented in Structured Notes  · 	triamterene-hydrochlorothiazide 37.5mg-25mg oral capsule: 1 cap(s) orally once a day in am  · 	atenolol 25 mg oral tablet: 1 tab(s) orally once a day in am  · 	amLODIPine 10 mg oral tablet: 1 tab(s) orally once a day in am  · 	Crestor 5 mg oral tablet: 1 tab(s) orally once a day in am        Allergies  No Known Allergies          ***VITAL SIGNS:  Vital Signs Last 24 Hrs  T(C): 36.7 (15 Feb 2018 08:51), Max: 36.7 (15 Feb 2018 08:51)  T(F): 98.1 (15 Feb 2018 08:51), Max: 98.1 (15 Feb 2018 08:51)  HR: 64 (15 Feb 2018 08:51) (64 - 64)  BP: 96/68 (15 Feb 2018 08:51) (96/68 - 96/68)  BP(mean): --  RR: 16 (15 Feb 2018 08:51) (16 - 16)  SpO2: 98% (15 Feb 2018 08:51) (98% - 98%)        I&O's Detail      15 Feb 2018 07:01  -  16 Feb 2018 00:46  --------------------------------------------------------  IN:    IV PiggyBack: 100 mL    lactated ringers.: 330 mL    Oral Fluid: 240 mL  Total IN: 670 mL    OUT:    Voided: 250 mL  Total OUT: 250 mL    Total NET: 420 mL            CAPILLARY BLOOD GLUCOSE          =======================  MEDICATIONS  ===================  MEDICATIONS  (STANDING):  atorvastatin 20 milliGRAM(s) Oral at bedtime  docusate sodium 100 milliGRAM(s) Oral three times a day  famotidine    Tablet 20 milliGRAM(s) Oral two times a day  heparin  Injectable 5000 Unit(s) SubCutaneous every 8 hours  HYDROmorphone PCA (1 mG/mL) 30 milliLiter(s) PCA Continuous  lactated ringers. 1000 milliLiter(s) (30 mL/Hr) IV Continuous    MEDICATIONS  (PRN):  dexamethasone  Injectable 4 milliGRAM(s) IV Push every 6 hours PRN Nausea, IF ondansetron is ineffective after 30 - 60 minute  ondansetron Injectable 4 milliGRAM(s) IV Push every 6 hours PRN Nausea          =================== PATIENT CARE ACCESS DEVICES ==========  Peripheral IV (+)   Arterial Line	Left / Rad(+)  No White    ======================= PHYSICAL EXAM===================  General:              Awake, alert, in mild  distress due to pain  Neuro:               Moving all extremities to commands. No focal deficits	  HEENT:                           PRASHANTH  Respiratory:	Lungs clear on auscultation bilaterally with good aeration.                           No rales, rhonchi, no wheezing. Effort even and unlabored.                          Right chest tube site - clean  CV:		Regular rate and rhythm. Normal S1/S2. No murmurs  Abdomen:         Soft,  nontender, not-distended. Bowel sounds present  Skin:		No rash.  Extremities:	Warm, no cyanosis or edema.  Palpable pulses    ============================ LABS =======================                          13.2   15.97 )-----------( 214      ( 15 Feb 2018 14:40 )             39.2     02-15    143       |  102  |  18  ----------------------------<  116<H>  3.3<L>   |  29  |  0.99    Ca    8.7      15 Feb 2018 14:40  Phos  3.1     02-15  Mg     2.0     02-15    TPro  6.6  /  Alb  4.1  /  TBili  0.3  /  DBili  x   /  AST  18  /  ALT  11  /  AlkPhos  63  02-15      ABG - ( 15 Feb 2018 14:40 )      pH: 7.38  /  pCO2: 50    /  pO2: 162   / HCO3: 28    / Base Excess: 4.4   /  SaO2: 99          PT/INR - ( 15 Feb 2018 14:40 )   PT: 10.9 SEC;   INR: 0.98     PTT:28.2 SEC        ===================== IMAGING STUDIES ===================  Radiology personally reviewed.        < from: Xray Chest 1 View AP/PA (02.15.18 @ 14:19) >  EXAM:  XR CHEST AP OR PA 1V        PROCEDURE DATE:  Feb 15 2018   INTERPRETATION:  TIME OF EXAM: February 15, 2018 at 2:11 PM.    CLINICAL INFORMATION: Status post right VATS. Right lower lobe superior   segmentectomy. Prior history of left upper lobectomy.    COMPARISON:  PET/CT scan from January 29, 2018.    TECHNIQUE:   AP Portable chest x-ray.    INTERPRETATION:     Heart size and the mediastinum cannot be accurately evaluated on this   projection.  There is right lung postsurgical change with chain sutures present.  A right chest tube has its tip in the apex.  This is small to moderate right apical pneumothorax with the superior   pleural edge just above the posterior right fourth rib.  Left lung volume loss with postsurgical change in the left upper chest is   again seen, not severely changed in appearance from the  image from   the PET CT scan. The left mid and lower lung is clear.  No pleural effusions are seen.  The visualized bony structures appear intact.            IMPRESSION:  Right lung postsurgical change with right chest tube in   place.    Small to moderate right apical pneumothorax.    Left lung volume loss with postsurgical change in the upper left chest.        < end of copied text >          ====================ASSESSMENT AND PLAN ================  61y/o female with RLL lung mass  now is s/p  02/15/18 Thoracoscopic segmentectomy,  Right uniport VATS, RLL wedge, RLL superior segmentectomy, lymph node dissection,  Intercostal nerve block   POD # : 0    Issues:    postop pain               right chest tube in place               right apical PTX               hypokalemia - supplemented               Hx lung cancer 2010, HTN, dyslipidemia      ====================== NEUROLOGY=======================  Pain control with PCA /Tylenol IV       ==================== RESPIRATORY========================  Pt is on    2        L nasal canula   Comfortable, no evidence of distress.  Incentive spirometry to start when pt's pain is controlled  Monitor chest tube output  Chest tube to  water seal	  Continue bronchodilators, pulmonary toilet  Head of bed elevation to 30-40 degrees  OOB in PM    ====================CARDIOVASCULAR=====================  Continue hemodynamic monitoring/ telemetry  Not on any pressors  Continue cardiovascular / antihypertensive medications    ===================== RENAL ============================  Continue LR 30CC/hr        Monitor I/Os, BUN/ Cr  and electrolytes  Voiding trial in progress ( no White cath)      ==================== GASTROINTESTINAL===================  NPO until fully awake post anesthesia and comfortable then will start clears  Continue GI prophylaxis with Pepcid   Zofran / Reglan for nausea - PRN	      =======================    ENDOCRIN  =====================  Glycemic monitoring  F/S with coverage  ===================HEMATOLOGIC/ONCOLOGIC =============  Monitor chest tube output. No signs of active bleeding.   Follow CBC, coags  in AM  DVT prophylaxis with SCD, sc Heparin    ========================INFECTIOUS DISEASE===============  No signs of infection. Monitor for fever / leukocytosis.  All surgical incision / chest tube  sites look clean      Pertinent clinical, laboratory, radiographic, hemodynamic, echocardiographic, respiratory data, microbiologic data and chart were reviewed and analyzed frequently throughout the course of the day and night. GI and DVT prophylaxis, glycemic control, head of bed elevation and skin care issues were addressed.  Patient seen, examined and plan discussed with CT Surgery / CTICU team during rounds.  Pt remains critically ill in imminent risk of  deterioration and requires very careful cardio- pulmonary monitoring and support.    I have spent     45         minutes of critical care time with this pt between  1:30    pm    and  11:55 pm         minutes spent on total encounter; more than 50% of the visit was spent counseling and/or coordinating care by the attending physician.        DESHAWN Donnelly MD ROCIO AGUAYO          MRN-[id number]    HPI:  63y/o female with RLL lung mass admitted  for flexible bronchoscopy, right video assisted thoracoscopy, possible right thoracotomy, lung resection .  Pt states, "hx of left VATS, upper lobectomy, s/p chemo and radiation 2010.  Cat scan done every yr, 2 yrs ago identified nodule in right lung, f/u ct shows nodule has grown in size."      Procedure:  02/15/18 Thoracoscopic segmentectomy,  Right uniport VATS, RLL wedge, RLL superior segmentectomy, lymph node dissection,  Intercostal nerve block   POD # : 0    Issues:    postop pain               right chest tube in place               Hx lung cancer 2010, HTN, dyslipidemia        Interval/OR Events/ ROS  Pt extubated in the OR post uneventful surgery. Arrived in ICU awake, c/o chest tube site pain; no SOB, no palpitations, no nausea         PAST MEDICAL & SURGICAL HISTORY:  Solitary pulmonary nodule  Hypercholesteremia  Benign Essential Hypertension  S/P lobectomy of lung: left VATS, left upper lobectomy 2010          Home Medications:   * Patient Currently Takes Medications as of 30-Jan-2018 15:14 documented in Structured Notes  · 	triamterene-hydrochlorothiazide 37.5mg-25mg oral capsule: 1 cap(s) orally once a day in am  · 	atenolol 25 mg oral tablet: 1 tab(s) orally once a day in am  · 	amLODIPine 10 mg oral tablet: 1 tab(s) orally once a day in am  · 	Crestor 5 mg oral tablet: 1 tab(s) orally once a day in am        Allergies  No Known Allergies        ***VITAL SIGNS:  Vital Signs Last 24 Hrs  T(C): 36.7 (15 Feb 2018 08:51), Max: 36.7 (15 Feb 2018 08:51)  T(F): 98.1 (15 Feb 2018 08:51), Max: 98.1 (15 Feb 2018 08:51)  HR: 64 (15 Feb 2018 08:51) (64 - 64)  BP: 96/68 (15 Feb 2018 08:51) (96/68 - 96/68)  BP(mean): --  RR: 16 (15 Feb 2018 08:51) (16 - 16)  SpO2: 98% (15 Feb 2018 08:51) (98% - 98%)        I&O's Detail      15 Feb 2018 07:01  -  16 Feb 2018 00:46  --------------------------------------------------------  IN:    IV PiggyBack: 100 mL    lactated ringers.: 330 mL    Oral Fluid: 240 mL  Total IN: 670 mL    OUT:    Voided: 250 mL  Total OUT: 250 mL    Total NET: 420 mL          =======================  MEDICATIONS  ===================  MEDICATIONS  (STANDING):  atorvastatin 20 milliGRAM(s) Oral at bedtime  docusate sodium 100 milliGRAM(s) Oral three times a day  famotidine    Tablet 20 milliGRAM(s) Oral two times a day  heparin  Injectable 5000 Unit(s) SubCutaneous every 8 hours  HYDROmorphone PCA (1 mG/mL) 30 milliLiter(s) PCA Continuous  lactated ringers. 1000 milliLiter(s) (30 mL/Hr) IV Continuous    MEDICATIONS  (PRN):  dexamethasone  Injectable 4 milliGRAM(s) IV Push every 6 hours PRN Nausea, IF ondansetron is ineffective after 30 - 60 minute  ondansetron Injectable 4 milliGRAM(s) IV Push every 6 hours PRN Nausea        =================== PATIENT CARE ACCESS DEVICES ==========  Peripheral IV (+)   Arterial Line	Left / Rad(+)  No White    ======================= PHYSICAL EXAM===================  General:              Awake, alert, in mild  distress due to pain  Neuro:               Moving all extremities to commands. No focal deficits	  HEENT:                           PRASHANTH  Respiratory:	Lungs clear on auscultation bilaterally with good aeration.                           No rales, rhonchi, no wheezing. Effort even and unlabored.                          Right chest tube site - clean  CV:		Regular rate and rhythm. Normal S1/S2. No murmurs  Abdomen:         Soft,  nontender, not-distended. Bowel sounds present  Skin:		No rash.  Extremities:	Warm, no cyanosis or edema.  Palpable pulses    ============================ LABS =======================                          13.2   15.97 )-----------( 214      ( 15 Feb 2018 14:40 )             39.2     02-15    143       |  102  |  18  ----------------------------<  116<H>  3.3<L>   |  29  |  0.99    Ca    8.7      15 Feb 2018 14:40  Phos  3.1     02-15  Mg     2.0     02-15    TPro  6.6  /  Alb  4.1  /  TBili  0.3  /  DBili  x   /  AST  18  /  ALT  11  /  AlkPhos  63  02-15    ABG - ( 15 Feb 2018 14:40 )      pH: 7.38  /  pCO2: 50    /  pO2: 162   / HCO3: 28    / Base Excess: 4.4   /  SaO2: 99        PT/INR - ( 15 Feb 2018 14:40 )   PT: 10.9 SEC;   INR: 0.98     PTT:28.2 SEC    ===================== IMAGING STUDIES ===================  Radiology personally reviewed.      < from: Xray Chest 1 View AP/PA (02.15.18 @ 14:19) >  EXAM:  XR CHEST AP OR PA 1V        PROCEDURE DATE:  Feb 15 2018   INTERPRETATION:  TIME OF EXAM: February 15, 2018 at 2:11 PM.    CLINICAL INFORMATION: Status post right VATS. Right lower lobe superior   segmentectomy. Prior history of left upper lobectomy.    COMPARISON:  PET/CT scan from January 29, 2018.    TECHNIQUE:   AP Portable chest x-ray.    INTERPRETATION:     Heart size and the mediastinum cannot be accurately evaluated on this   projection.  There is right lung postsurgical change with chain sutures present.  A right chest tube has its tip in the apex.  This is small to moderate right apical pneumothorax with the superior   pleural edge just above the posterior right fourth rib.  Left lung volume loss with postsurgical change in the left upper chest is   again seen, not severely changed in appearance from the  image from   the PET CT scan. The left mid and lower lung is clear.  No pleural effusions are seen.  The visualized bony structures appear intact.        IMPRESSION:  Right lung postsurgical change with right chest tube in   place.    Small to moderate right apical pneumothorax.    Left lung volume loss with postsurgical change in the upper left chest.    < end of copied text >      ====================ASSESSMENT AND PLAN ================  63y/o female with RLL lung mass  now is s/p  02/15/18 Thoracoscopic segmentectomy,  Right uniport VATS, RLL wedge, RLL superior segmentectomy, lymph node dissection,  Intercostal nerve block   POD # : 0    Issues:    postop pain               right chest tube in place               right apical PTX               hypokalemia - supplemented               Hx lung cancer 2010, HTN, dyslipidemia      ====================== NEUROLOGY=======================  Pain control with PCA /Tylenol IV     ==================== RESPIRATORY========================  Pt is on    2        L nasal canula   Comfortable, no evidence of distress.  Incentive spirometry to start when pt's pain is controlled  Monitor chest tube output  Chest tube to  water seal	  Continue bronchodilators, pulmonary toilet  Head of bed elevation to 30-40 degrees  OOB in PM    ====================CARDIOVASCULAR=====================  Continue hemodynamic monitoring/ telemetry  Not on any pressors  Continue cardiovascular / antihypertensive medications    ===================== RENAL ============================  Continue LR 30CC/hr        Monitor I/Os, BUN/ Cr  and electrolytes  Voiding trial in progress ( no White cath)      ==================== GASTROINTESTINAL===================  NPO until fully awake post anesthesia and comfortable then will start clears  Continue GI prophylaxis with Pepcid   Zofran / Reglan for nausea - PRN	    =======================    ENDOCRIN  =====================  Glycemic monitoring  F/S with coverage  ===================HEMATOLOGIC/ONCOLOGIC =============  Monitor chest tube output. No signs of active bleeding.   Follow CBC, coags  in AM  DVT prophylaxis with SCD, sc Heparin    ========================INFECTIOUS DISEASE===============  No signs of infection. Monitor for fever / leukocytosis.  All surgical incision / chest tube  sites look clean      Pertinent clinical, laboratory, radiographic, hemodynamic, echocardiographic, respiratory data, microbiologic data and chart were reviewed and analyzed frequently throughout the course of the day and night. GI and DVT prophylaxis, glycemic control, head of bed elevation and skin care issues were addressed.  Patient seen, examined and plan discussed with CT Surgery / CTICU team during rounds.  Pt remains critically ill in imminent risk of  deterioration and requires very careful cardio- pulmonary monitoring and support.    I have spent     45         minutes of critical care time with this pt between  1:30    pm    and  11:55 pm         minutes spent on total encounter; more than 50% of the visit was spent counseling and/or coordinating care by the attending physician.        DESHAWN Donnelly MD

## 2018-02-15 NOTE — PATIENT PROFILE ADULT. - VISION (WITH CORRECTIVE LENSES IF THE PATIENT USUALLY WEARS THEM):
Normal vision: sees adequately in most situations; can see medication labels, newsprint/patient wears glasses

## 2018-02-16 ENCOUNTER — TRANSCRIPTION ENCOUNTER (OUTPATIENT)
Age: 63
End: 2018-02-16

## 2018-02-16 VITALS — HEART RATE: 73 BPM | OXYGEN SATURATION: 98 % | RESPIRATION RATE: 21 BRPM | TEMPERATURE: 99 F

## 2018-02-16 LAB
BUN SERPL-MCNC: 20 MG/DL — SIGNIFICANT CHANGE UP (ref 7–23)
CALCIUM SERPL-MCNC: 8.9 MG/DL — SIGNIFICANT CHANGE UP (ref 8.4–10.5)
CHLORIDE SERPL-SCNC: 100 MMOL/L — SIGNIFICANT CHANGE UP (ref 98–107)
CO2 SERPL-SCNC: 26 MMOL/L — SIGNIFICANT CHANGE UP (ref 22–31)
CREAT SERPL-MCNC: 0.96 MG/DL — SIGNIFICANT CHANGE UP (ref 0.5–1.3)
GLUCOSE SERPL-MCNC: 137 MG/DL — HIGH (ref 70–99)
HCT VFR BLD CALC: 38.1 % — SIGNIFICANT CHANGE UP (ref 34.5–45)
HGB BLD-MCNC: 12.9 G/DL — SIGNIFICANT CHANGE UP (ref 11.5–15.5)
MAGNESIUM SERPL-MCNC: 1.9 MG/DL — SIGNIFICANT CHANGE UP (ref 1.6–2.6)
MCHC RBC-ENTMCNC: 31.2 PG — SIGNIFICANT CHANGE UP (ref 27–34)
MCHC RBC-ENTMCNC: 33.9 % — SIGNIFICANT CHANGE UP (ref 32–36)
MCV RBC AUTO: 92.3 FL — SIGNIFICANT CHANGE UP (ref 80–100)
NRBC # FLD: 0 — SIGNIFICANT CHANGE UP
PHOSPHATE SERPL-MCNC: 3.1 MG/DL — SIGNIFICANT CHANGE UP (ref 2.5–4.5)
PLATELET # BLD AUTO: 207 K/UL — SIGNIFICANT CHANGE UP (ref 150–400)
PMV BLD: 10.3 FL — SIGNIFICANT CHANGE UP (ref 7–13)
POTASSIUM SERPL-MCNC: 4.1 MMOL/L — SIGNIFICANT CHANGE UP (ref 3.5–5.3)
POTASSIUM SERPL-SCNC: 4.1 MMOL/L — SIGNIFICANT CHANGE UP (ref 3.5–5.3)
RBC # BLD: 4.13 M/UL — SIGNIFICANT CHANGE UP (ref 3.8–5.2)
RBC # FLD: 13 % — SIGNIFICANT CHANGE UP (ref 10.3–14.5)
SODIUM SERPL-SCNC: 139 MMOL/L — SIGNIFICANT CHANGE UP (ref 135–145)
WBC # BLD: 12.39 K/UL — HIGH (ref 3.8–10.5)
WBC # FLD AUTO: 12.39 K/UL — HIGH (ref 3.8–10.5)

## 2018-02-16 PROCEDURE — 99233 SBSQ HOSP IP/OBS HIGH 50: CPT

## 2018-02-16 PROCEDURE — 99238 HOSP IP/OBS DSCHRG MGMT 30/<: CPT

## 2018-02-16 PROCEDURE — 71045 X-RAY EXAM CHEST 1 VIEW: CPT | Mod: 26

## 2018-02-16 RX ORDER — ACETAMINOPHEN 500 MG
2 TABLET ORAL
Qty: 0 | Refills: 0 | COMMUNITY
Start: 2018-02-16

## 2018-02-16 RX ORDER — OXYCODONE AND ACETAMINOPHEN 5; 325 MG/1; MG/1
1 TABLET ORAL EVERY 4 HOURS
Qty: 0 | Refills: 0 | Status: DISCONTINUED | OUTPATIENT
Start: 2018-02-16 | End: 2018-02-16

## 2018-02-16 RX ORDER — ACETAMINOPHEN 500 MG
650 TABLET ORAL EVERY 6 HOURS
Qty: 0 | Refills: 0 | Status: DISCONTINUED | OUTPATIENT
Start: 2018-02-16 | End: 2018-02-16

## 2018-02-16 RX ORDER — DOCUSATE SODIUM 100 MG
1 CAPSULE ORAL
Qty: 0 | Refills: 0 | COMMUNITY
Start: 2018-02-16

## 2018-02-16 RX ADMIN — Medication 650 MILLIGRAM(S): at 12:00

## 2018-02-16 RX ADMIN — OXYCODONE AND ACETAMINOPHEN 1 TABLET(S): 5; 325 TABLET ORAL at 10:15

## 2018-02-16 RX ADMIN — Medication 650 MILLIGRAM(S): at 12:30

## 2018-02-16 RX ADMIN — HYDROMORPHONE HYDROCHLORIDE 30 MILLILITER(S): 2 INJECTION INTRAMUSCULAR; INTRAVENOUS; SUBCUTANEOUS at 07:06

## 2018-02-16 RX ADMIN — Medication 100 MILLIGRAM(S): at 05:42

## 2018-02-16 RX ADMIN — Medication 100 MILLIGRAM(S): at 13:52

## 2018-02-16 RX ADMIN — FAMOTIDINE 20 MILLIGRAM(S): 10 INJECTION INTRAVENOUS at 05:42

## 2018-02-16 RX ADMIN — HEPARIN SODIUM 5000 UNIT(S): 5000 INJECTION INTRAVENOUS; SUBCUTANEOUS at 05:42

## 2018-02-16 RX ADMIN — OXYCODONE AND ACETAMINOPHEN 1 TABLET(S): 5; 325 TABLET ORAL at 09:43

## 2018-02-16 NOTE — PROGRESS NOTE ADULT - SUBJECTIVE AND OBJECTIVE BOX
ROCIO AGUAYO          MRN-[id number]    HPI:  61y/o female scheduled for flexible bronchoscopy, right video assisted thoracoscopy, possible right thoracotomy, lung resection with epidural on 2/1/2018.  Pt states, "hx of left VATS, upper lobectomy, s/p chemo and radiation 2010.  Cat scan done every yr, 2 yrs ago identified nodule in right lung, f/u ct shows nodule has grown in size." (30 Jan 2018 15:13)      Procedure:  POD # :     Issues:        Interval/Overnight Events/ ROS  Pt remained hemodynamically stable overnight, not on any pressors or inotropes. OOB to chair, breathing comfortably with minimal pain. Ambulated several times . Denies pain, no SOB, no palpitations, no nausea/ no vomiting, no dizziness  A-line and fraser d/joaquina         PAST MEDICAL & SURGICAL HISTORY:  Solitary pulmonary nodule  Hypercholesteremia  Benign Essential Hypertension  S/P lobectomy of lung: left VATS, left upper lobectomy 2010  No Past Surgical History    Allergies    No Known Allergies    Intolerances            ***VITAL SIGNS:  Vital Signs Last 24 Hrs  T(C): 36.9 (16 Feb 2018 00:00), Max: 36.9 (16 Feb 2018 00:00)  T(F): 98.4 (16 Feb 2018 00:00), Max: 98.4 (16 Feb 2018 00:00)  HR: 68 (16 Feb 2018 03:00) (61 - 74)  BP: 109/69 (16 Feb 2018 01:00) (96/68 - 113/72)  BP(mean): 79 (16 Feb 2018 01:00) (73 - 82)  RR: 16 (16 Feb 2018 03:00) (10 - 23)  SpO2: 96% (16 Feb 2018 03:00) (95% - 100%)    I/Os:   I&O's Detail    15 Feb 2018 07:01  -  16 Feb 2018 03:01  --------------------------------------------------------  IN:    IV PiggyBack: 100 mL    lactated ringers.: 450 mL    Oral Fluid: 240 mL  Total IN: 790 mL    OUT:    Voided: 500 mL  Total OUT: 500 mL    Total NET: 290 mL          CAPILLARY BLOOD GLUCOSE          =======================  MEDICATIONS  ===================  MEDICATIONS  (STANDING):  atorvastatin 20 milliGRAM(s) Oral at bedtime  docusate sodium 100 milliGRAM(s) Oral three times a day  famotidine    Tablet 20 milliGRAM(s) Oral two times a day  heparin  Injectable 5000 Unit(s) SubCutaneous every 8 hours  HYDROmorphone PCA (1 mG/mL) 30 milliLiter(s) PCA Continuous PCA Continuous  lactated ringers. 1000 milliLiter(s) (30 mL/Hr) IV Continuous <Continuous>    MEDICATIONS  (PRN):  dexamethasone  Injectable 4 milliGRAM(s) IV Push every 6 hours PRN Nausea, IF ondansetron is ineffective after 30 - 60 minute  ondansetron Injectable 4 milliGRAM(s) IV Push every 6 hours PRN Nausea      ======================VENTILATOR SETTINGS  ==============      =================== PATIENT CARE ACCESS DEVICES ==========  Peripheral IV  Central Venous Line	R	L	IJ	Fem	SC			Placed:   Arterial Line	R	L	PT	DP	Fem	Rad	Ax	Placed:   Midline:				  Urinary Catheter, Date Placed:   Necessity of urinary, arterial, and venous catheters discussed    ======================= PHYSICAL EXAM===================  General:                         Comfortable, Awake, alert, not in any distress  Neuro:                            Moving all extremities to commands. No focal deficits	  HEENT:                           PRASHANTH/ ETT/ NGT/ trach  Respiratory:	Lungs clear on auscultation bilaterally with good aeration.                                           No rales, rhonchi, no wheezing. Effort even and unlabored.  CV:		Regular rate and rhythm. Normal S1/S2. No murmurs  Abdomen:	                     Soft,  nontender, not-distended. Bowel sounds present / absent.   Skin:		No rash.  Extremities:	Warm, no cyanosis or edema.  Palpable pulses    ============================ LABS =======================                        12.9   12.39 )-----------( 207      ( 16 Feb 2018 02:20 )             38.1     02-15    143  |  102  |  18  ----------------------------<  116<H>  3.3<L>   |  29  |  0.99    Ca    8.7      15 Feb 2018 14:40  Phos  3.1     02-15  Mg     2.0     02-15    TPro  6.6  /  Alb  4.1  /  TBili  0.3  /  DBili  x   /  AST  18  /  ALT  11  /  AlkPhos  63  02-15    LIVER FUNCTIONS - ( 15 Feb 2018 14:40 )  Alb: 4.1 g/dL / Pro: 6.6 g/dL / ALK PHOS: 63 u/L / ALT: 11 u/L / AST: 18 u/L / GGT: x           PT/INR - ( 15 Feb 2018 14:40 )   PT: 10.9 SEC;   INR: 0.98          PTT - ( 15 Feb 2018 14:40 )  PTT:28.2 SEC  ABG - ( 15 Feb 2018 14:40 )  pH: 7.38  /  pCO2: 50    /  pO2: 162   / HCO3: 28    / Base Excess: 4.4   /  SaO2: 99                      ===================== IMAGING STUDIES ===================  Radiology personally reviewed.    ====================ASSESSMENT AND PLAN ================      ====================== NEUROLOGY=======================  Pain control with PCA / PCEA / Tylenol IV / Toradol / Percocet  Pt is on Precedex for agitation  Pt is sedated with Propofol / Fentanyl    ==================== RESPIRATORY========================  Pt is on            L nasal canula / Face tent____% FiO2  Comfortable, no evidence of distress.  Using incentive spirometry & doing                ml  Monitor chest tube output  Chest tube to suction / water seal	    Mechanical Ventilation:    Mechanical ventilator status assessed & settings reviewed  Continue bronchodilators, pulmonary toilet  Head of bed elevation to 30-40 degrees    ====================CARDIOVASCULAR=====================  Continue hemodynamic monitoring/ telemetry  Not on any pressors  Continue cardiovascular / antihypertensive medications    ===================== RENAL ============================  Continue LR 30CC/hr      D/C IVF  Monitor I/Os, BUN/ Cr  and electrolytes  D/C Fraser      Keep Fraser  BPH: Continue Flomax/ Finasteride      ==================== GASTROINTESTINAL===================  On regular diet, tolerating well  Continue GI prophylaxis with Pepcid / Protonix  Continue Zofran / Reglan for nausea - PRN	  NPO    =======================    ENDOCRIN  =====================  Glycemic monitoring  F/S with coverage  ===================HEMATOLOGIC/ONCOLOGIC =============  Monitor chest tube output. No signs of active bleeding.   Follow CBC, coags  in AM  DVT prophylaxis with SCD, sc Heparin    ========================INFECTIOUS DISEASE===============  No signs of infection. Monitor for fever / leukocytosis.  All surgical incision / chest tube  sites look clean  D/C Fraser      Pertinent clinical, laboratory, radiographic, hemodynamic, echocardiographic, respiratory data, microbiologic data and chart were reviewed and analyzed frequently throughout the course of the day and night. GI and DVT prophylaxis, glycemic control, head of bed elevation and skin care issues were addressed.  Patient seen, examined and plan discussed with CT Surgery / CTICU team during rounds.  Pt remains critically ill in imminent risk of  deterioration and requires very careful cardio- pulmonary monitoring and support.    I have spent               minutes of critical care time with this pt between            am/pm    and               am/ pm         minutes spent on total encounter; more than 50% of the visit was spent counseling and/or coordinating care by the attending physician.        DESHAWN Donnelly MD ROCIO AGUAYO          MRN-[id number]      HPI:  63y/o female with RLL lung mass admitted  for flexible bronchoscopy, right video assisted thoracoscopy, possible right thoracotomy, lung resection .  Pt states, "hx of left VATS, upper lobectomy, s/p chemo and radiation 2010.  Cat scan done every yr, 2 yrs ago identified nodule in right lung, f/u ct shows nodule has grown in size."      Procedure:  02/15/18 Thoracoscopic segmentectomy,  Right uniport VATS, RLL wedge, RLL superior segmentectomy, lymph node dissection,  Intercostal nerve block   POD # : 1    Issues:    postop pain               right chest tube in place               Hx lung cancer 2010, HTN, dyslipidemia              Interval/Overnight Events/ ROS  Pt remained hemodynamically stable overnight, not on any pressors or inotropes. OOB to chair, breathing comfortably with minimal pain. Ambulated several times . Denies pain, no SOB, no palpitations, no nausea/ no vomiting, no dizziness           PAST MEDICAL & SURGICAL HISTORY:  Solitary pulmonary nodule  Hypercholesteremia  Benign Essential Hypertension  S/P lobectomy of lung: left VATS, left upper lobectomy 2010  No Past Surgical History    Allergies    No Known Allergies    Intolerances            ***VITAL SIGNS:  Vital Signs Last 24 Hrs  T(C): 36.9 (16 Feb 2018 00:00), Max: 36.9 (16 Feb 2018 00:00)  T(F): 98.4 (16 Feb 2018 00:00), Max: 98.4 (16 Feb 2018 00:00)  HR: 68 (16 Feb 2018 03:00) (61 - 74)  BP: 109/69 (16 Feb 2018 01:00) (96/68 - 113/72)  BP(mean): 79 (16 Feb 2018 01:00) (73 - 82)  RR: 16 (16 Feb 2018 03:00) (10 - 23)  SpO2: 96% (16 Feb 2018 03:00) (95% - 100%)    I/Os:   I&O's Detail    15 Feb 2018 07:01  -  16 Feb 2018 03:01  --------------------------------------------------------  IN:    IV PiggyBack: 100 mL    lactated ringers.: 450 mL    Oral Fluid: 240 mL  Total IN: 790 mL    OUT:    Voided: 500 mL  Total OUT: 500 mL    Total NET: 290 mL          CAPILLARY BLOOD GLUCOSE          =======================  MEDICATIONS  ===================  MEDICATIONS  (STANDING):  atorvastatin 20 milliGRAM(s) Oral at bedtime  docusate sodium 100 milliGRAM(s) Oral three times a day  famotidine    Tablet 20 milliGRAM(s) Oral two times a day  heparin  Injectable 5000 Unit(s) SubCutaneous every 8 hours  HYDROmorphone PCA (1 mG/mL) 30 milliLiter(s) PCA Continuous PCA Continuous  lactated ringers. 1000 milliLiter(s) (30 mL/Hr) IV Continuous <Continuous>    MEDICATIONS  (PRN):  dexamethasone  Injectable 4 milliGRAM(s) IV Push every 6 hours PRN Nausea, IF ondansetron is ineffective after 30 - 60 minute  ondansetron Injectable 4 milliGRAM(s) IV Push every 6 hours PRN Nausea      ======================VENTILATOR SETTINGS  ==============      =================== PATIENT CARE ACCESS DEVICES ==========  Peripheral IV  Central Venous Line	R	L	IJ	Fem	SC			Placed:   Arterial Line	R	L	PT	DP	Fem	Rad	Ax	Placed:   Midline:				  Urinary Catheter, Date Placed:   Necessity of urinary, arterial, and venous catheters discussed    ======================= PHYSICAL EXAM===================  General:                         Comfortable, Awake, alert, not in any distress  Neuro:                            Moving all extremities to commands. No focal deficits	  HEENT:                           PRASHANTH/ ETT/ NGT/ trach  Respiratory:	Lungs clear on auscultation bilaterally with good aeration.                                           No rales, rhonchi, no wheezing. Effort even and unlabored.  CV:		Regular rate and rhythm. Normal S1/S2. No murmurs  Abdomen:	                     Soft,  nontender, not-distended. Bowel sounds present / absent.   Skin:		No rash.  Extremities:	Warm, no cyanosis or edema.  Palpable pulses    ============================ LABS =======================                        12.9   12.39 )-----------( 207      ( 16 Feb 2018 02:20 )             38.1     02-15    143  |  102  |  18  ----------------------------<  116<H>  3.3<L>   |  29  |  0.99    Ca    8.7      15 Feb 2018 14:40  Phos  3.1     02-15  Mg     2.0     02-15    TPro  6.6  /  Alb  4.1  /  TBili  0.3  /  DBili  x   /  AST  18  /  ALT  11  /  AlkPhos  63  02-15    LIVER FUNCTIONS - ( 15 Feb 2018 14:40 )  Alb: 4.1 g/dL / Pro: 6.6 g/dL / ALK PHOS: 63 u/L / ALT: 11 u/L / AST: 18 u/L / GGT: x           PT/INR - ( 15 Feb 2018 14:40 )   PT: 10.9 SEC;   INR: 0.98          PTT - ( 15 Feb 2018 14:40 )  PTT:28.2 SEC  ABG - ( 15 Feb 2018 14:40 )  pH: 7.38  /  pCO2: 50    /  pO2: 162   / HCO3: 28    / Base Excess: 4.4   /  SaO2: 99                      ===================== IMAGING STUDIES ===================  Radiology personally reviewed.    ====================ASSESSMENT AND PLAN ================      ====================== NEUROLOGY=======================  Pain control with PCA / PCEA / Tylenol IV / Toradol / Percocet  Pt is on Precedex for agitation  Pt is sedated with Propofol / Fentanyl    ==================== RESPIRATORY========================  Pt is on            L nasal canula / Face tent____% FiO2  Comfortable, no evidence of distress.  Using incentive spirometry & doing                ml  Monitor chest tube output  Chest tube to suction / water seal	    Mechanical Ventilation:    Mechanical ventilator status assessed & settings reviewed  Continue bronchodilators, pulmonary toilet  Head of bed elevation to 30-40 degrees    ====================CARDIOVASCULAR=====================  Continue hemodynamic monitoring/ telemetry  Not on any pressors  Continue cardiovascular / antihypertensive medications    ===================== RENAL ============================  Continue LR 30CC/hr      D/C IVF  Monitor I/Os, BUN/ Cr  and electrolytes  D/C White      Keep White  BPH: Continue Flomax/ Finasteride      ==================== GASTROINTESTINAL===================  On regular diet, tolerating well  Continue GI prophylaxis with Pepcid / Protonix  Continue Zofran / Reglan for nausea - PRN	  NPO    =======================    ENDOCRIN  =====================  Glycemic monitoring  F/S with coverage  ===================HEMATOLOGIC/ONCOLOGIC =============  Monitor chest tube output. No signs of active bleeding.   Follow CBC, coags  in AM  DVT prophylaxis with SCD, sc Heparin    ========================INFECTIOUS DISEASE===============  No signs of infection. Monitor for fever / leukocytosis.  All surgical incision / chest tube  sites look clean  D/C White      Pertinent clinical, laboratory, radiographic, hemodynamic, echocardiographic, respiratory data, microbiologic data and chart were reviewed and analyzed frequently throughout the course of the day and night. GI and DVT prophylaxis, glycemic control, head of bed elevation and skin care issues were addressed.  Patient seen, examined and plan discussed with CT Surgery / CTICU team during rounds.  Pt remains critically ill in imminent risk of  deterioration and requires very careful cardio- pulmonary monitoring and support.    I have spent               minutes of critical care time with this pt between            am/pm    and               am/ pm         minutes spent on total encounter; more than 50% of the visit was spent counseling and/or coordinating care by the attending physician.        DESHAWN Donnelly MD ROCIO AGUAYO          MRN-[id number]      HPI:  63y/o female with RLL lung mass admitted  for flexible bronchoscopy, right video assisted thoracoscopy, possible right thoracotomy, lung resection .  Pt states, "hx of left VATS, upper lobectomy, s/p chemo and radiation 2010.  Cat scan done every yr, 2 yrs ago identified nodule in right lung, f/u ct shows nodule has grown in size."      Procedure:  02/15/18 Thoracoscopic segmentectomy,  Right uniport VATS, RLL wedge, RLL superior segmentectomy, lymph node dissection,  Intercostal nerve block   POD # : 1    Issues:    postop pain               right chest tube in place               Hx lung cancer 2010, HTN, dyslipidemia              Interval/Overnight Events/ ROS  Pt remained hemodynamically stable overnight, not on any pressors or inotropes. OOB to chair, breathing comfortably with minimal pain. Ambulated several times . Denies pain, no SOB, no palpitations, no nausea/ no vomiting, no dizziness           PAST MEDICAL & SURGICAL HISTORY:  Solitary pulmonary nodule  Hypercholesteremia  Benign Essential Hypertension  S/P lobectomy of lung: left VATS, left upper lobectomy 2010  No Past Surgical History      Home Medications:   * Patient Currently Takes Medications as of 30-Jan-2018 15:14 documented in Structured Notes  · 	triamterene-hydrochlorothiazide 37.5mg-25mg oral capsule: 1 cap(s) orally once a day in am  · 	atenolol 25 mg oral tablet: 1 tab(s) orally once a day in am  · 	amLODIPine 10 mg oral tablet: 1 tab(s) orally once a day in am  · 	Crestor 5 mg oral tablet: 1 tab(s) orally once a day in am      Allergies  No Known Allergies    Intolerances            ***VITAL SIGNS:  Vital Signs Last 24 Hrs  T(C): 36.9 (16 Feb 2018 00:00), Max: 36.9 (16 Feb 2018 00:00)  T(F): 98.4 (16 Feb 2018 00:00), Max: 98.4 (16 Feb 2018 00:00)  HR: 68 (16 Feb 2018 03:00) (61 - 74)  BP: 109/69 (16 Feb 2018 01:00) (96/68 - 113/72)  BP(mean): 79 (16 Feb 2018 01:00) (73 - 82)  RR: 16 (16 Feb 2018 03:00) (10 - 23)  SpO2: 96% (16 Feb 2018 03:00) (95% - 100%)    I/Os:   I&O's Detail    15 Feb 2018 07:01  -  16 Feb 2018 03:01  --------------------------------------------------------  IN:    IV PiggyBack: 100 mL    lactated ringers.: 450 mL    Oral Fluid: 240 mL  Total IN: 790 mL    OUT:    Voided: 500 mL  Total OUT: 500 mL    Total NET: 290 mL          CAPILLARY BLOOD GLUCOSE          =======================  MEDICATIONS  ===================  MEDICATIONS  (STANDING):  atorvastatin 20 milliGRAM(s) Oral at bedtime  docusate sodium 100 milliGRAM(s) Oral three times a day  famotidine    Tablet 20 milliGRAM(s) Oral two times a day  heparin  Injectable 5000 Unit(s) SubCutaneous every 8 hours  HYDROmorphone PCA (1 mG/mL) 30 milliLiter(s) PCA Continuous PCA Continuous  lactated ringers. 1000 milliLiter(s) (30 mL/Hr) IV Continuous <Continuous>    MEDICATIONS  (PRN):  dexamethasone  Injectable 4 milliGRAM(s) IV Push every 6 hours PRN Nausea, IF ondansetron is ineffective after 30 - 60 minute  ondansetron Injectable 4 milliGRAM(s) IV Push every 6 hours PRN Nausea          ============================ LABS =======================                        12.9   12.39 )-----------( 207      ( 16 Feb 2018 02:20 )             38.1     02-15    143  |  102  |  18  ----------------------------<  116<H>  3.3<L>   |  29  |  0.99    Ca    8.7      15 Feb 2018 14:40  Phos  3.1     02-15  Mg     2.0     02-15    TPro  6.6  /  Alb  4.1  /  TBili  0.3  /  DBili  x   /  AST  18  /  ALT  11  /  AlkPhos  63  02-15    LIVER FUNCTIONS - ( 15 Feb 2018 14:40 )  Alb: 4.1 g/dL / Pro: 6.6 g/dL / ALK PHOS: 63 u/L / ALT: 11 u/L / AST: 18 u/L / GGT: x           PT/INR - ( 15 Feb 2018 14:40 )   PT: 10.9 SEC;   INR: 0.98          PTT - ( 15 Feb 2018 14:40 )  PTT:28.2 SEC  ABG - ( 15 Feb 2018 14:40 )  pH: 7.38  /  pCO2: 50    /  pO2: 162   / HCO3: 28    / Base Excess: 4.4   /  SaO2: 99                      ===================== IMAGING STUDIES ===================  Radiology personally reviewed.    ====================ASSESSMENT AND PLAN ================      ====================== NEUROLOGY=======================  Pain control with PCA / PCEA / Tylenol IV / Toradol / Percocet  Pt is on Precedex for agitation  Pt is sedated with Propofol / Fentanyl    ==================== RESPIRATORY========================  Pt is on            L nasal canula / Face tent____% FiO2  Comfortable, no evidence of distress.  Using incentive spirometry & doing                ml  Monitor chest tube output  Chest tube to suction / water seal	    Mechanical Ventilation:    Mechanical ventilator status assessed & settings reviewed  Continue bronchodilators, pulmonary toilet  Head of bed elevation to 30-40 degrees    ====================CARDIOVASCULAR=====================  Continue hemodynamic monitoring/ telemetry  Not on any pressors  Continue cardiovascular / antihypertensive medications    ===================== RENAL ============================  Continue LR 30CC/hr      D/C IVF  Monitor I/Os, BUN/ Cr  and electrolytes  D/C White      Keep White  BPH: Continue Flomax/ Finasteride      ==================== GASTROINTESTINAL===================  On regular diet, tolerating well  Continue GI prophylaxis with Pepcid / Protonix  Continue Zofran / Reglan for nausea - PRN	  NPO    =======================    ENDOCRIN  =====================  Glycemic monitoring  F/S with coverage  ===================HEMATOLOGIC/ONCOLOGIC =============  Monitor chest tube output. No signs of active bleeding.   Follow CBC, coags  in AM  DVT prophylaxis with SCD, sc Heparin    ========================INFECTIOUS DISEASE===============  No signs of infection. Monitor for fever / leukocytosis.  All surgical incision / chest tube  sites look clean  D/C White      Pertinent clinical, laboratory, radiographic, hemodynamic, echocardiographic, respiratory data, microbiologic data and chart were reviewed and analyzed frequently throughout the course of the day and night. GI and DVT prophylaxis, glycemic control, head of bed elevation and skin care issues were addressed.  Patient seen, examined and plan discussed with CT Surgery / CTICU team during rounds.  Pt remains critically ill in imminent risk of  deterioration and requires very careful cardio- pulmonary monitoring and support.    I have spent               minutes of critical care time with this pt between            am/pm    and               am/ pm         minutes spent on total encounter; more than 50% of the visit was spent counseling and/or coordinating care by the attending physician.        DESHAWN Donnelly MD ROCIO AGUAYO          MRN-[id number]      HPI:  61y/o female with RLL lung mass admitted  for flexible bronchoscopy, right video assisted thoracoscopy, possible right thoracotomy, lung resection .  Pt states, "hx of left VATS, upper lobectomy, s/p chemo and radiation 2010.  Cat scan done every yr, 2 yrs ago identified nodule in right lung, f/u ct shows nodule has grown in size."      Procedure:  02/15/18 Thoracoscopic segmentectomy,  Right uniport VATS, RLL wedge, RLL superior segmentectomy, lymph node dissection,  Intercostal nerve block   POD # : 1    Issues:    postop pain               right chest tube in place               Hx lung cancer 2010, HTN, dyslipidemia              Interval/Overnight Events/ ROS  Pt remained hemodynamically stable overnight, not on any pressors or inotropes. OOB to chair, breathing comfortably with minimal pain. Ambulated several times . Denies pain, no SOB, no palpitations, no nausea/ no vomiting, no dizziness           PAST MEDICAL & SURGICAL HISTORY:  Solitary pulmonary nodule  Hypercholesteremia  Benign Essential Hypertension  S/P lobectomy of lung: left VATS, left upper lobectomy 2010  No Past Surgical History      Home Medications:   * Patient Currently Takes Medications as of 30-Jan-2018 15:14 documented in Structured Notes  · 	triamterene-hydrochlorothiazide 37.5mg-25mg oral capsule: 1 cap(s) orally once a day in am  · 	atenolol 25 mg oral tablet: 1 tab(s) orally once a day in am  · 	amLODIPine 10 mg oral tablet: 1 tab(s) orally once a day in am  · 	Crestor 5 mg oral tablet: 1 tab(s) orally once a day in am      Allergies  No Known Allergies    Intolerances            ***VITAL SIGNS:  Vital Signs Last 24 Hrs  T(C): 36.9 (16 Feb 2018 00:00), Max: 36.9 (16 Feb 2018 00:00)  T(F): 98.4 (16 Feb 2018 00:00), Max: 98.4 (16 Feb 2018 00:00)  HR: 68 (16 Feb 2018 03:00) (61 - 74)  BP: 109/69 (16 Feb 2018 01:00) (96/68 - 113/72)  BP(mean): 79 (16 Feb 2018 01:00) (73 - 82)  RR: 16 (16 Feb 2018 03:00) (10 - 23)  SpO2: 96% (16 Feb 2018 03:00) (95% - 100%)    I/Os:   I&O's Detail    15 Feb 2018 07:01  -  16 Feb 2018 03:01  --------------------------------------------------------  IN:    IV PiggyBack: 100 mL    lactated ringers.: 450 mL    Oral Fluid: 240 mL  Total IN: 790 mL    OUT:    Voided: 500 mL  Total OUT: 500 mL    Total NET: 290 mL          CAPILLARY BLOOD GLUCOSE          =======================  MEDICATIONS  ===================  MEDICATIONS  (STANDING):  atorvastatin 20 milliGRAM(s) Oral at bedtime  docusate sodium 100 milliGRAM(s) Oral three times a day  famotidine    Tablet 20 milliGRAM(s) Oral two times a day  heparin  Injectable 5000 Unit(s) SubCutaneous every 8 hours  HYDROmorphone PCA (1 mG/mL) 30 milliLiter(s) PCA Continuous PCA Continuous  lactated ringers. 1000 milliLiter(s) (30 mL/Hr) IV Continuous <Continuous>    MEDICATIONS  (PRN):  dexamethasone  Injectable 4 milliGRAM(s) IV Push every 6 hours PRN Nausea, IF ondansetron is ineffective after 30 - 60 minute  ondansetron Injectable 4 milliGRAM(s) IV Push every 6 hours PRN Nausea          =================== PATIENT CARE ACCESS DEVICES ==========  Peripheral IV (+)   Arterial Line	Left / Rad(+)  No White    ======================= PHYSICAL EXAM===================  General:              Awake, alert, in mild  distress due to pain  Neuro:               Moving all extremities to commands. No focal deficits	  HEENT:                           PRASHANTH  Respiratory:	Lungs clear on auscultation bilaterally with good aeration.                           No rales, rhonchi, no wheezing. Effort even and unlabored.                          Right chest tube site - clean  CV:		Regular rate and rhythm. Normal S1/S2. No murmurs  Abdomen:         Soft,  nontender, not-distended. Bowel sounds present  Skin:		No rash.  Extremities:	Warm, no cyanosis or edema.  Palpable pulses          ============================ LABS =======================                        12.9   12.39 )-----------( 207      ( 16 Feb 2018 02:20 )             38.1     02-15    143  |  102  |  18  ----------------------------<  116<H>  3.3<L>   |  29  |  0.99    Ca    8.7      15 Feb 2018 14:40  Phos  3.1     02-15  Mg     2.0     02-15    TPro  6.6  /  Alb  4.1  /  TBili  0.3  /  DBili  x   /  AST  18  /  ALT  11  /  AlkPhos  63  02-15    LIVER FUNCTIONS - ( 15 Feb 2018 14:40 )  Alb: 4.1 g/dL / Pro: 6.6 g/dL / ALK PHOS: 63 u/L / ALT: 11 u/L / AST: 18 u/L / GGT: x           PT/INR - ( 15 Feb 2018 14:40 )   PT: 10.9 SEC;   INR: 0.98          PTT - ( 15 Feb 2018 14:40 )  PTT:28.2 SEC  ABG - ( 15 Feb 2018 14:40 )  pH: 7.38  /  pCO2: 50    /  pO2: 162   / HCO3: 28    / Base Excess: 4.4   /  SaO2: 99                      ===================== IMAGING STUDIES ===================  Radiology personally reviewed.        Pertinent clinical, laboratory, radiographic, hemodynamic, echocardiographic, respiratory data, microbiologic data and chart were reviewed and analyzed frequently throughout the course of the day and night. GI and DVT prophylaxis, glycemic control, head of bed elevation and skin care issues were addressed.  Patient seen, examined and plan discussed with CT Surgery / CTICU team during rounds.  Pt remains critically ill in imminent risk of  deterioration and requires very careful cardio- pulmonary monitoring and support.    I have spent               minutes of critical care time with this pt between            am/pm    and               am/ pm         minutes spent on total encounter; more than 50% of the visit was spent counseling and/or coordinating care by the attending physician.        DESHAWN Donnelly MD ROCIO AGUAYO          MRN-[id number]      HPI:  61y/o female with RLL lung mass admitted  for flexible bronchoscopy, right video assisted thoracoscopy, possible right thoracotomy, lung resection .  Pt states, "hx of left VATS, upper lobectomy, s/p chemo and radiation 2010.  Cat scan done every yr, 2 yrs ago identified nodule in right lung, f/u ct shows nodule has grown in size."      Procedure:  02/15/18 Thoracoscopic segmentectomy,  Right uniport VATS, RLL wedge, RLL superior segmentectomy, lymph node dissection,  Intercostal nerve block   POD # : 1    Issues:    postop pain               right chest tube in place               Hx lung cancer 2010, HTN, dyslipidemia              Interval/Overnight Events/ ROS  Pt remained hemodynamically stable overnight, not on any pressors or inotropes. OOB to chair, breathing comfortably with minimal pain. Ambulated several times . Denies pain, no SOB, no palpitations, no nausea/ no vomiting, no dizziness           PAST MEDICAL & SURGICAL HISTORY:  Solitary pulmonary nodule  Hypercholesteremia  Benign Essential Hypertension  S/P lobectomy of lung: left VATS, left upper lobectomy 2010  No Past Surgical History      Home Medications:   * Patient Currently Takes Medications as of 30-Jan-2018 15:14 documented in Structured Notes  · 	triamterene-hydrochlorothiazide 37.5mg-25mg oral capsule: 1 cap(s) orally once a day in am  · 	atenolol 25 mg oral tablet: 1 tab(s) orally once a day in am  · 	amLODIPine 10 mg oral tablet: 1 tab(s) orally once a day in am  · 	Crestor 5 mg oral tablet: 1 tab(s) orally once a day in am      Allergies  No Known Allergies    Intolerances            ***VITAL SIGNS:  Vital Signs Last 24 Hrs  T(C): 36.9 (16 Feb 2018 00:00), Max: 36.9 (16 Feb 2018 00:00)  T(F): 98.4 (16 Feb 2018 00:00), Max: 98.4 (16 Feb 2018 00:00)  HR: 68 (16 Feb 2018 03:00) (61 - 74)  BP: 109/69 (16 Feb 2018 01:00) (96/68 - 113/72)  BP(mean): 79 (16 Feb 2018 01:00) (73 - 82)  RR: 16 (16 Feb 2018 03:00) (10 - 23)  SpO2: 96% (16 Feb 2018 03:00) (95% - 100%)    I/Os:   I&O's Detail    15 Feb 2018 07:01  -  16 Feb 2018 03:01  --------------------------------------------------------  IN:    IV PiggyBack: 100 mL    lactated ringers.: 450 mL    Oral Fluid: 240 mL  Total IN: 790 mL    OUT:    Voided: 500 mL  Total OUT: 500 mL    Total NET: 290 mL          CAPILLARY BLOOD GLUCOSE          =======================  MEDICATIONS  ===================  MEDICATIONS  (STANDING):  atorvastatin 20 milliGRAM(s) Oral at bedtime  docusate sodium 100 milliGRAM(s) Oral three times a day  famotidine    Tablet 20 milliGRAM(s) Oral two times a day  heparin  Injectable 5000 Unit(s) SubCutaneous every 8 hours  HYDROmorphone PCA (1 mG/mL) 30 milliLiter(s) PCA Continuous PCA Continuous  lactated ringers. 1000 milliLiter(s) (30 mL/Hr) IV Continuous <Continuous>    MEDICATIONS  (PRN):  dexamethasone  Injectable 4 milliGRAM(s) IV Push every 6 hours PRN Nausea, IF ondansetron is ineffective after 30 - 60 minute  ondansetron Injectable 4 milliGRAM(s) IV Push every 6 hours PRN Nausea          =================== PATIENT CARE ACCESS DEVICES ==========  Peripheral IV (+)   Arterial Line	Left / Rad(+)  No White    ======================= PHYSICAL EXAM===================  General:              Awake, alert, in mild  distress due to pain  Neuro:               Moving all extremities to commands. No focal deficits	  HEENT:                           PRASHANTH  Respiratory:	Lungs clear on auscultation bilaterally with good aeration.                           No rales, rhonchi, no wheezing. Effort even and unlabored.                          Right chest tube site - clean  CV:		Regular rate and rhythm. Normal S1/S2. No murmurs  Abdomen:         Soft,  nontender, not-distended. Bowel sounds present  Skin:		No rash.  Extremities:	Warm, no cyanosis or edema.  Palpable pulses          ============================ LABS =======================                        12.9   12.39 )-----------( 207      ( 16 Feb 2018 02:20 )             38.1     02-15    143  |  102  |  18  ----------------------------<  116<H>  3.3<L>   |  29  |  0.99    Ca    8.7      15 Feb 2018 14:40  Phos  3.1     02-15  Mg     2.0     02-15    TPro  6.6  /  Alb  4.1  /  TBili  0.3  /  DBili  x   /  AST  18  /  ALT  11  /  AlkPhos  63  02-15    LIVER FUNCTIONS - ( 15 Feb 2018 14:40 )  Alb: 4.1 g/dL / Pro: 6.6 g/dL / ALK PHOS: 63 u/L / ALT: 11 u/L / AST: 18 u/L / GGT: x           PT/INR - ( 15 Feb 2018 14:40 )   PT: 10.9 SEC;   INR: 0.98          PTT - ( 15 Feb 2018 14:40 )  PTT:28.2 SEC  ABG - ( 15 Feb 2018 14:40 )  pH: 7.38  /  pCO2: 50    /  pO2: 162   / HCO3: 28    / Base Excess: 4.4   /  SaO2: 99                      ===================== IMAGING STUDIES ===================  Radiology personally reviewed.    ====================ASSESSMENT AND PLAN ================  61y/o female with RLL lung mass  now is s/p  02/15/18 Thoracoscopic segmentectomy,  Right uniport VATS, RLL wedge, RLL superior segmentectomy, lymph node dissection,  Intercostal nerve block   POD # : 0    Issues:    postop pain               right chest tube in place               right apical PTX               hypokalemia - supplemented               Hx lung cancer 2010, HTN, dyslipidemia      ====================== NEUROLOGY=======================  Pain control with PCA /Tylenol IV     ==================== RESPIRATORY========================  Pt is on    2        L nasal canula   Comfortable, no evidence of distress.  Incentive spirometry to start when pt's pain is controlled  Monitor chest tube output  Chest tube to  water seal	  Continue bronchodilators, pulmonary toilet  Head of bed elevation to 30-40 degrees  OOB in PM    ====================CARDIOVASCULAR=====================  Continue hemodynamic monitoring/ telemetry  Not on any pressors  Continue cardiovascular / antihypertensive medications    ===================== RENAL ============================  Continue LR 30CC/hr        Monitor I/Os, BUN/ Cr  and electrolytes  Voiding trial in progress ( no White cath)      ==================== GASTROINTESTINAL===================  NPO until fully awake post anesthesia and comfortable then will start clears  Continue GI prophylaxis with Pepcid   Zofran / Reglan for nausea - PRN	    =======================    ENDOCRIN  =====================  Glycemic monitoring  F/S with coverage  ===================HEMATOLOGIC/ONCOLOGIC =============  Monitor chest tube output. No signs of active bleeding.   Follow CBC, coags  in AM  DVT prophylaxis with SCD, sc Heparin    ========================INFECTIOUS DISEASE===============  No signs of infection. Monitor for fever / leukocytosis.  All surgical incision / chest tube  sites look clean      Pertinent clinical, laboratory, radiographic, hemodynamic, echocardiographic, respiratory data, microbiologic data and chart were reviewed and analyzed frequently throughout the course of the day and night. GI and DVT prophylaxis, glycemic control, head of bed elevation and skin care issues were addressed.  Patient seen, examined and plan discussed with CT Surgery / CTICU team during rounds.  Pt remains critically ill in imminent risk of  deterioration and requires very careful cardio- pulmonary monitoring and support.    I have spent     35   minutes of critical care time with this pt between   12  am   and    7 am         minutes spent on total encounter; more than 50% of the visit was spent counseling and/or coordinating care by the attending physician.        DESHAWN Donnelly MD ROCIO AGUAYO          MRN-[id number]      HPI:  63y/o female with RLL lung mass admitted  for flexible bronchoscopy, right video assisted thoracoscopy, possible right thoracotomy, lung resection .  Pt states, "hx of left VATS, upper lobectomy, s/p chemo and radiation 2010.  Cat scan done every yr, 2 yrs ago identified nodule in right lung, f/u ct shows nodule has grown in size."      Procedure:  02/15/18 Thoracoscopic segmentectomy,  Right uniport VATS, RLL wedge, RLL superior segmentectomy, lymph node dissection,  Intercostal nerve block   POD # : 1    Issues:    postop pain               right chest tube in place               Hx lung cancer 2010, HTN, dyslipidemia              Interval/Overnight Events/ ROS  Pt remained hemodynamically stable overnight, not on any pressors or inotropes. OOB to chair, breathing comfortably with minimal pain. Ambulated several times . Denies pain, no SOB, no palpitations, no nausea/ no vomiting, no dizziness           PAST MEDICAL & SURGICAL HISTORY:  Solitary pulmonary nodule  Hypercholesteremia  Benign Essential Hypertension  S/P lobectomy of lung: left VATS, left upper lobectomy 2010  No Past Surgical History      Home Medications:   * Patient Currently Takes Medications as of 30-Jan-2018 15:14 documented in Structured Notes  · 	triamterene-hydrochlorothiazide 37.5mg-25mg oral capsule: 1 cap(s) orally once a day in am  · 	atenolol 25 mg oral tablet: 1 tab(s) orally once a day in am  · 	amLODIPine 10 mg oral tablet: 1 tab(s) orally once a day in am  · 	Crestor 5 mg oral tablet: 1 tab(s) orally once a day in am      Allergies  No Known Allergies    Intolerances        ICU Vital Signs Last 24 Hrs  T(C): 36.2 (16 Feb 2018 04:00), Max: 36.9 (16 Feb 2018 00:00)  T(F): 97.2 (16 Feb 2018 04:00), Max: 98.4 (16 Feb 2018 00:00)  HR: 68 (16 Feb 2018 06:00) (61 - 74)  BP: 102/68 (16 Feb 2018 06:00) (96/68 - 113/72)  BP(mean): 76 (16 Feb 2018 06:00) (65 - 82)  ABP: 115/86 (16 Feb 2018 05:00) (90/59 - 123/91)  ABP(mean): 99 (16 Feb 2018 05:00) (73 - 105)  RR: 19 (16 Feb 2018 06:00) (10 - 23)  SpO2: 100% (16 Feb 2018 06:00) (95% - 100%)          I&O's Detail    15 Feb 2018 07:01  -  16 Feb 2018 06:59  --------------------------------------------------------  IN:    IV PiggyBack: 100 mL    lactated ringers.: 540 mL    Oral Fluid: 240 mL  Total IN: 880 mL    OUT:    Voided: 700 mL  Total OUT: 700 mL    Total NET: 180 mL      CAPILLARY BLOOD GLUCOSE          =======================  MEDICATIONS  ===================  MEDICATIONS  (STANDING):  atorvastatin 20 milliGRAM(s) Oral at bedtime  docusate sodium 100 milliGRAM(s) Oral three times a day  famotidine    Tablet 20 milliGRAM(s) Oral two times a day  heparin  Injectable 5000 Unit(s) SubCutaneous every 8 hours  HYDROmorphone PCA (1 mG/mL) 30 milliLiter(s) PCA Continuous PCA Continuous  lactated ringers. 1000 milliLiter(s) (30 mL/Hr) IV Continuous <Continuous>    MEDICATIONS  (PRN):  dexamethasone  Injectable 4 milliGRAM(s) IV Push every 6 hours PRN Nausea, IF ondansetron is ineffective after 30 - 60 minute  ondansetron Injectable 4 milliGRAM(s) IV Push every 6 hours PRN Nausea          =================== PATIENT CARE ACCESS DEVICES ==========  Peripheral IV (+)   Arterial Line	Left / Rad(+)  No White    ======================= PHYSICAL EXAM===================  General:              Awake, alert, in mild  distress due to pain  Neuro:               Moving all extremities to commands. No focal deficits	  HEENT:                           PRASHANTH  Respiratory:	Lungs clear on auscultation bilaterally with good aeration.                           No rales, rhonchi, no wheezing. Effort even and unlabored.                          Right chest tube site - clean  CV:		Regular rate and rhythm. Normal S1/S2. No murmurs  Abdomen:         Soft,  nontender, not-distended. Bowel sounds present  Skin:		No rash.  Extremities:	Warm, no cyanosis or edema.  Palpable pulses          ============================ LABS =======================                            12.9   12.39 )-----------( 207      ( 16 Feb 2018 02:20 )             38.1                     12.9   12.39 )-----------( 207      ( 16 Feb 2018 02:20 )             38.1       02-16    139  |  100  |  20  ----------------------------<  137<H>  4.1   |  26  |  0.96    Ca    8.9      16 Feb 2018 02:20  Phos  3.1     02-16  Mg     1.9     02-16    TPro  6.6  /  Alb  4.1  /  TBili  0.3  /  DBili  x   /  AST  18  /  ALT  11  /  AlkPhos  63  02-15    02-15    143  |  102  |  18  ----------------------------<  116<H>  3.3<L>   |  29  |  0.99    Ca    8.7      15 Feb 2018 14:40  Phos  3.1     02-15  Mg     2.0     02-15    TPro  6.6  /  Alb  4.1  /  TBili  0.3  /  DBili  x   /  AST  18  /  ALT  11  /  AlkPhos  63  02-15    LIVER FUNCTIONS - ( 15 Feb 2018 14:40 )  Alb: 4.1 g/dL / Pro: 6.6 g/dL / ALK PHOS: 63 u/L / ALT: 11 u/L / AST: 18 u/L / GGT: x           PT/INR - ( 15 Feb 2018 14:40 )   PT: 10.9 SEC;   INR: 0.98          PTT - ( 15 Feb 2018 14:40 )  PTT:28.2 SEC  ABG - ( 15 Feb 2018 14:40 )  pH: 7.38  /  pCO2: 50    /  pO2: 162   / HCO3: 28    / Base Excess: 4.4   /  SaO2: 99                      ===================== IMAGING STUDIES ===================  Radiology personally reviewed.  < from: Xray Chest 1 View AP/PA (02.15.18 @ 14:19) >  EXAM:  XR CHEST AP OR PA 1V        PROCEDURE DATE:  Feb 15 2018         INTERPRETATION:  TIME OF EXAM: February 15, 2018 at 2:11 PM.    CLINICAL INFORMATION: Status post right VATS. Right lower lobe superior   segmentectomy. Prior history of left upper lobectomy.    COMPARISON:  PET/CT scan from January 29, 2018.    TECHNIQUE:   AP Portable chest x-ray.    INTERPRETATION:     Heart size and the mediastinum cannot be accurately evaluated on this   projection.  There is right lung postsurgical change with chain sutures present.  A right chest tube has its tip in the apex.  This is small to moderate right apical pneumothorax with the superior   pleural edge just above the posterior right fourth rib.  Left lung volume loss with postsurgical change in the left upper chest is   again seen, not severely changed in appearance from the  image from   the PET CT scan. The left mid and lower lung is clear.  No pleural effusions are seen.  The visualized bony structures appear intact.            IMPRESSION:  Right lung postsurgical change with right chest tube in   place.    Small to moderate right apical pneumothorax.    Left lung volume loss with postsurgical change in the upper left chest.    < end of copied text >    ====================ASSESSMENT AND PLAN ================  63y/o female with RLL lung mass  now is s/p  02/15/18 Thoracoscopic segmentectomy,  Right uniport VATS, RLL wedge, RLL superior segmentectomy, lymph node dissection,  Intercostal nerve block   POD # :1    Issues:    postop pain               right chest tube in place               right apical PTX               hypokalemia - supplemented               Hx lung cancer 2010, HTN, dyslipidemia      ====================== NEUROLOGY=======================  Pain control with PCA /Tylenol IV     ==================== RESPIRATORY========================  Pt is on    2        L nasal canula   Comfortable, no evidence of distress.  Incentive spirometry to start when pt's pain is controlled  Monitor chest tube output  Chest tube to  water seal	  Continue bronchodilators, pulmonary toilet  Head of bed elevation to 30-40 degrees  OOB in PM    ====================CARDIOVASCULAR=====================  Continue hemodynamic monitoring/ telemetry  Not on any pressors  Continue cardiovascular / antihypertensive medications    ===================== RENAL ============================  Continue LR 30CC/hr        Monitor I/Os, BUN/ Cr  and electrolytes  Voiding trial in progress ( no White cath)      ==================== GASTROINTESTINAL===================  NPO until fully awake post anesthesia and comfortable then will start clears  Continue GI prophylaxis with Pepcid   Zofran / Reglan for nausea - PRN	    =======================    ENDOCRIN  =====================  Glycemic monitoring  F/S with coverage  ===================HEMATOLOGIC/ONCOLOGIC =============  Monitor chest tube output. No signs of active bleeding.   Follow CBC, coags  in AM  DVT prophylaxis with SCD, sc Heparin    ========================INFECTIOUS DISEASE===============  No signs of infection. Monitor for fever / leukocytosis.  All surgical incision / chest tube  sites look clean      Pertinent clinical, laboratory, radiographic, hemodynamic, echocardiographic, respiratory data, microbiologic data and chart were reviewed and analyzed frequently throughout the course of the day and night. GI and DVT prophylaxis, glycemic control, head of bed elevation and skin care issues were addressed.  Patient seen, examined and plan discussed with CT Surgery / CTICU team during rounds.  Pt remains critically ill in imminent risk of  deterioration and requires very careful cardio- pulmonary monitoring and support.    I have spent     35   minutes of critical care time with this pt between   12  am   and    7 am         minutes spent on total encounter; more than 50% of the visit was spent counseling and/or coordinating care by the attending physician.        DESHAWN Donnelly MD

## 2018-02-16 NOTE — DISCHARGE NOTE ADULT - PATIENT PORTAL LINK FT
You can access the [facility] Patient Portal, offered by [facility], by registering with the following website: [email or web address]

## 2018-02-16 NOTE — DISCHARGE NOTE ADULT - PLAN OF CARE
s/p VATS, lung resection. Goal is wound healing, final pathology. Walk 4-5 x per day. Increase as tolerated. YOu may climb stairs. Continue to use incentive spirometer.   You  may remove all dressings in 48hrs then begin to shower. Keep wounds uncovered. Suture will be removed in office.   See Dr. Weldon in 2 weeks. Call for an apt. [phone]. Have a chest xray prior to your apt and then bring those films with you.

## 2018-02-16 NOTE — DISCHARGE NOTE ADULT - VISION (WITH CORRECTIVE LENSES IF THE PATIENT USUALLY WEARS THEM):
patient wears glasses/Normal vision: sees adequately in most situations; can see medication labels, newsprint

## 2018-02-16 NOTE — PROGRESS NOTE ADULT - SUBJECTIVE AND OBJECTIVE BOX
Day _2_ of Anesthesia Pain Management Service    Allergies  No Known Allergies      SUBJECTIVE: "I did ok with the pump overnight."    Pain Scale Score	At rest: _2-3/10_ 	With Activity: ___ 	[ ] Refer to charted pain scores    THERAPY:    [ ] IV PCA Morphine		[ ] 5 mg/mL	[ ] 1 mg/mL  [X] IV PCA Hydromorphone	[ ] 5 mg/mL	[X] 1 mg/mL  [ ] IV PCA Fentanyl		[ ] 50 micrograms/mL    Demand dose _0.2mg_ lockout _6_ (minutes) Continuous Rate _0_ Total: _3.2mg_  Daily      MEDICATIONS  (STANDING):  acetaminophen   Tablet. 650 milliGRAM(s) Oral every 6 hours  atorvastatin 20 milliGRAM(s) Oral at bedtime  docusate sodium 100 milliGRAM(s) Oral three times a day  famotidine    Tablet 20 milliGRAM(s) Oral two times a day  heparin  Injectable 5000 Unit(s) SubCutaneous every 8 hours  lactated ringers. 1000 milliLiter(s) (30 mL/Hr) IV Continuous <Continuous>    MEDICATIONS  (PRN):  oxyCODONE    5 mG/acetaminophen 325 mG 1 Tablet(s) Oral every 4 hours PRN Moderate Pain (4 - 6)      OBJECTIVE: A&Ox3, NAD, sitting up in chair. Family present.    Sedation Score:	[X] Alert	[ ] Drowsy	[ ] Arousable	[ ] Asleep	[ ] Unresponsive    Side Effects:	[X] None	[ ] Nausea	[ ] Vomiting	[ ] Pruritus  		  [ ] Weakness		[ ] Numbness	[ ] Other:    PT/INR - ( 15 Feb 2018 14:40 )   PT: 10.9 SEC;   INR: 0.98     PTT - ( 15 Feb 2018 14:40 )  PTT:28.2 SEC                          12.9   12.39 )-----------( 207      ( 16 Feb 2018 02:20 )             38.1       02-16    139  |  100  |  20  ----------------------------<  137<H>  4.1   |  26  |  0.96    Ca    8.9      16 Feb 2018 02:20  Phos  3.1     02-16  Mg     1.9     02-16    TPro  6.6  /  Alb  4.1  /  TBili  0.3  /  DBili  x   /  AST  18  /  ALT  11  /  AlkPhos  63  02-15      ASSESSMENT/ PLAN    Therapy to  be:	[] Continue   [x] Discontinued   [x] Change to prn Analgesics    Documentation and Verification of current medications:  [X] Done	[ ] Not done, not eligible  [ ] Not done, reason not given    Comments:  Chest tube removed; Percocet by CT ICU team  Add Tylenol q6hrs x 4 doses

## 2018-02-16 NOTE — DISCHARGE NOTE ADULT - MEDICATION SUMMARY - MEDICATIONS TO TAKE
I will START or STAY ON the medications listed below when I get home from the hospital:    acetaminophen 325 mg oral tablet  -- 2 tab(s) by mouth every 6 hours  -- Indication: For mild pain    oxyCODONE-acetaminophen 5 mg-325 mg oral tablet  -- 2 tab(s) by mouth every 4 hours, As Needed for severe pain. Can take 1 tab for moderate pain. MDD:10  -- Indication: For moderate to severe pain    Crestor 5 mg oral tablet  -- 1 tab(s) by mouth once a day in am  -- Indication: For cholesterol    triamterene-hydrochlorothiazide 37.5mg-25mg oral capsule  -- 1 cap(s) by mouth once a day in am  -- Indication: For blood pressure    atenolol 25 mg oral tablet  -- 1 tab(s) by mouth once a day in am  -- Indication: For heart rate    amLODIPine 10 mg oral tablet  -- 1 tab(s) by mouth once a day in am  -- Indication: For blood pressure    docusate sodium 100 mg oral capsule  -- 1 cap(s) by mouth 3 times a day  -- Indication: For constipation

## 2018-02-16 NOTE — DISCHARGE NOTE ADULT - NS AS ACTIVITY OBS
Showering allowed/Walking-Indoors allowed/Do not make important decisions/Do not drive or operate machinery/No Heavy lifting/straining/Sex allowed/Walking-Outdoors allowed/Stairs allowed

## 2018-02-16 NOTE — DISCHARGE NOTE ADULT - HOSPITAL COURSE
61y/o female scheduled for flexible bronchoscopy, right video assisted thoracoscopy, possible right thoracotomy, lung resection with epidural on 2/1/2018.  Pt states, "hx of left VATS, upper lobectomy, s/p chemo and radiation 2010.  Cat scan done every yr, 2 yrs ago identified nodule in right lung, f/u ct shows nodule has grown in size." On 2/15/18 pt. had a Rt. VATS, Rt LL segmentectomy. POst op non complicated. CT removed today. CXR stable. Cleared for discharge by Dr. Weldon.

## 2018-02-16 NOTE — DISCHARGE NOTE ADULT - CARE PLAN
Principal Discharge DX:	Solitary pulmonary nodule  Goal:	s/p VATS, lung resection. Goal is wound healing, final pathology.  Assessment and plan of treatment:	Walk 4-5 x per day. Increase as tolerated. YOu may climb stairs. Continue to use incentive spirometer.   You  may remove all dressings in 48hrs then begin to shower. Keep wounds uncovered. Suture will be removed in office.   See Dr. Weldon in 2 weeks. Call for an apt. [phone]. Have a chest xray prior to your apt and then bring those films with you.

## 2018-02-16 NOTE — DISCHARGE NOTE ADULT - CARE PROVIDER_API CALL
Raymundo Weldon), Surgery; Thoracic Surgery  [address] [place] [address]  Phone: [phone]  Fax: [phone]

## 2018-02-21 LAB — SURGICAL PATHOLOGY STUDY: SIGNIFICANT CHANGE UP

## 2018-02-27 DIAGNOSIS — R91.1 SOLITARY PULMONARY NODULE: ICD-10-CM

## 2018-03-06 ENCOUNTER — APPOINTMENT (OUTPATIENT)
Dept: THORACIC SURGERY | Facility: CLINIC | Age: 63
End: 2018-03-06
Payer: COMMERCIAL

## 2018-03-06 VITALS
RESPIRATION RATE: 18 BRPM | BODY MASS INDEX: 24.99 KG/M2 | OXYGEN SATURATION: 100 % | HEIGHT: 65 IN | HEART RATE: 83 BPM | WEIGHT: 150 LBS | DIASTOLIC BLOOD PRESSURE: 77 MMHG | TEMPERATURE: 97.3 F | SYSTOLIC BLOOD PRESSURE: 116 MMHG

## 2018-03-06 PROCEDURE — 99024 POSTOP FOLLOW-UP VISIT: CPT

## 2018-06-12 ENCOUNTER — APPOINTMENT (OUTPATIENT)
Dept: CV DIAGNOSTICS | Facility: HOSPITAL | Age: 63
End: 2018-06-12

## 2018-06-12 ENCOUNTER — OUTPATIENT (OUTPATIENT)
Dept: OUTPATIENT SERVICES | Facility: HOSPITAL | Age: 63
LOS: 1 days | End: 2018-06-12
Payer: COMMERCIAL

## 2018-06-12 DIAGNOSIS — Z90.2 ACQUIRED ABSENCE OF LUNG [PART OF]: Chronic | ICD-10-CM

## 2018-06-12 DIAGNOSIS — I25.10 ATHEROSCLEROTIC HEART DISEASE OF NATIVE CORONARY ARTERY WITHOUT ANGINA PECTORIS: ICD-10-CM

## 2018-06-12 PROCEDURE — 93017 CV STRESS TEST TRACING ONLY: CPT

## 2018-06-12 PROCEDURE — 78452 HT MUSCLE IMAGE SPECT MULT: CPT

## 2018-06-12 PROCEDURE — 93018 CV STRESS TEST I&R ONLY: CPT

## 2018-06-12 PROCEDURE — 93016 CV STRESS TEST SUPVJ ONLY: CPT

## 2018-06-12 PROCEDURE — 78452 HT MUSCLE IMAGE SPECT MULT: CPT | Mod: 26

## 2018-06-12 PROCEDURE — A9500: CPT

## 2018-06-19 ENCOUNTER — APPOINTMENT (OUTPATIENT)
Dept: THORACIC SURGERY | Facility: CLINIC | Age: 63
End: 2018-06-19
Payer: COMMERCIAL

## 2018-06-19 VITALS
RESPIRATION RATE: 18 BRPM | DIASTOLIC BLOOD PRESSURE: 84 MMHG | HEART RATE: 75 BPM | WEIGHT: 150 LBS | BODY MASS INDEX: 24.96 KG/M2 | OXYGEN SATURATION: 99 % | SYSTOLIC BLOOD PRESSURE: 122 MMHG

## 2018-06-19 PROCEDURE — 99214 OFFICE O/P EST MOD 30 MIN: CPT

## 2018-06-19 RX ORDER — ASPIRIN 81 MG
81 TABLET, DELAYED RELEASE (ENTERIC COATED) ORAL
Refills: 0 | Status: ACTIVE | COMMUNITY

## 2018-08-27 PROBLEM — R91.1 SOLITARY PULMONARY NODULE: Chronic | Status: ACTIVE | Noted: 2018-01-30

## 2018-09-18 ENCOUNTER — APPOINTMENT (OUTPATIENT)
Dept: THORACIC SURGERY | Facility: CLINIC | Age: 63
End: 2018-09-18
Payer: COMMERCIAL

## 2018-09-18 VITALS
OXYGEN SATURATION: 96 % | HEIGHT: 65 IN | BODY MASS INDEX: 24.99 KG/M2 | WEIGHT: 150 LBS | SYSTOLIC BLOOD PRESSURE: 127 MMHG | RESPIRATION RATE: 17 BRPM | DIASTOLIC BLOOD PRESSURE: 85 MMHG | HEART RATE: 63 BPM

## 2018-09-18 PROCEDURE — 99213 OFFICE O/P EST LOW 20 MIN: CPT

## 2018-09-25 ENCOUNTER — APPOINTMENT (OUTPATIENT)
Dept: THORACIC SURGERY | Facility: CLINIC | Age: 63
End: 2018-09-25

## 2018-12-06 ENCOUNTER — OTHER (OUTPATIENT)
Age: 63
End: 2018-12-06

## 2019-01-08 ENCOUNTER — APPOINTMENT (OUTPATIENT)
Dept: THORACIC SURGERY | Facility: CLINIC | Age: 64
End: 2019-01-08
Payer: COMMERCIAL

## 2019-01-08 VITALS
HEIGHT: 65 IN | BODY MASS INDEX: 24.99 KG/M2 | OXYGEN SATURATION: 99 % | RESPIRATION RATE: 16 BRPM | HEART RATE: 82 BPM | SYSTOLIC BLOOD PRESSURE: 132 MMHG | DIASTOLIC BLOOD PRESSURE: 92 MMHG | TEMPERATURE: 98 F | WEIGHT: 150 LBS

## 2019-01-08 PROCEDURE — 99213 OFFICE O/P EST LOW 20 MIN: CPT

## 2019-01-08 NOTE — HISTORY OF PRESENT ILLNESS
[FreeTextEntry1] : Ms. Rodrigues is a 63 year old female who presents today for follow up. She is s/p right VATS, RLL wedge resection, anatomic RLL superior segmentectomy, MLND on 2/15/18 with pathology revealing adenocarcinoma, papillary predominant, pT1bN0. Additionally, she is is s/p left thoracotomy, SAMANTHA lobectomy, lysis of adhesions, MLND on 9/3/2010 with Dr. 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 \par \par Chest CT Scan 12/28/18 revealed:\par - mediastinum is shifted to the left secondary to prior left lung surgery\par - areas of traction bronchiectasis identified left upper zone, stable\par - associated pleural thickening left upper lung zone is unchanged, probably represents post radiation changes\par - no mediastinal or hilar lymphadenopathy is identified\par - post surgical clips are identified within the right infrahilar area extending into the superior segment of the right lower lobe.  Fissural nodularity and thickening on the right is stable.\par - no interval development of new pulmonary nodule or suspicious area for metastatic disease\par - small pericardial effusion\par - small right hepatic cysts are identified\par \par She reports that she has been feeling well, but she tripped (over a cement step) and now complains of left arm and flank pain due to the fall.  She denies any fatigue, fever, chills, cough, shortness of breath, chest pain, hemoptysis, or recent illness.

## 2019-01-08 NOTE — ASSESSMENT
[FreeTextEntry1] : 62 y/o female S/P right VATS, RLL wedge resection, anatomic RLL superior segmentectomy, MLND on 2/15/18 with pathology revealing adenocarcinoma, papillary predominant, pT1bN0.  Previously S/P left thoracotomy, SAMANTHA lobectomy, lysis of adhesions, MLND on 9/3/2010 by Dr. Braden for NSCLC with adenomatous and squamous features, pT2N1, bronchial margin was positive for tumor, lymphovascular permeation present, tumor present at the peripheral tissue of vascular margin.  She received adjuvant chemotherapy and RT. \par \par Chest CT Scan 12/28/18 revealed:\par - mediastinum is shifted to the left secondary to prior left lung surgery\par - areas of traction bronchiectasis identified left upper zone, stable\par - associated pleural thickening left upper lung zone is unchanged, probably represents post radiation changes\par - no mediastinal or hilar lymphadenopathy is identified\par - post surgical clips are identified within the right infrahilar area extending into the superior segment of the right lower lobe.  Fissural nodularity and thickening on the right is stable.\par - no interval development of new pulmonary nodule or suspicious area for metastatic disease\par - small pericardial effusion\par - small right hepatic cysts are identified\par \par I have reviewed the patient's medical records and diagnostic images during the time of this office visit, and I have made the following recommendation: \par 1.  I would like her to follow up in 3 months with non-contrast Chest CT Scan.\par \par Written by Martina Jackson NP, acting as a scribe for RENE CAMERON MD.\par \par The documentation recorded by the scribe accurately reflects the service I personally performed and the decisions made by me. RENE CAMERON MD\par

## 2019-01-08 NOTE — PHYSICAL EXAM
[Sclera] : the sclera and conjunctiva were normal [] : the neck was supple [Neck Cervical Mass (___cm)] : no neck mass was observed [Respiration, Rhythm And Depth] : normal respiratory rhythm and effort [Auscultation Breath Sounds / Voice Sounds] : lungs were clear to auscultation bilaterally [Heart Rate And Rhythm] : heart rate was normal and rhythm regular [Heart Sounds] : normal S1 and S2 [Chest Visual Inspection Thoracic Asymmetry] : no chest asymmetry [Diminished Respiratory Excursion] : normal chest expansion [Abdomen Soft] : soft [Abdomen Tenderness] : non-tender [No CVA Tenderness] : no ~M costovertebral angle tenderness [No Spinal Tenderness] : no spinal tenderness [Abnormal Walk] : normal gait [No Focal Deficits] : no focal deficits [Oriented To Time, Place, And Person] : oriented to person, place, and time [Impaired Insight] : insight and judgment were intact [Affect] : the affect was normal [FreeTextEntry1] : deferred

## 2019-04-19 ENCOUNTER — OTHER (OUTPATIENT)
Age: 64
End: 2019-04-19

## 2019-04-30 ENCOUNTER — APPOINTMENT (OUTPATIENT)
Dept: THORACIC SURGERY | Facility: CLINIC | Age: 64
End: 2019-04-30
Payer: COMMERCIAL

## 2019-04-30 VITALS
OXYGEN SATURATION: 99 % | TEMPERATURE: 97.9 F | DIASTOLIC BLOOD PRESSURE: 90 MMHG | SYSTOLIC BLOOD PRESSURE: 130 MMHG | RESPIRATION RATE: 16 BRPM | BODY MASS INDEX: 24.99 KG/M2 | WEIGHT: 150 LBS | HEART RATE: 75 BPM | HEIGHT: 65 IN

## 2019-04-30 PROCEDURE — 99214 OFFICE O/P EST MOD 30 MIN: CPT

## 2019-04-30 RX ORDER — CHROMIUM 200 MCG
TABLET ORAL
Refills: 0 | Status: ACTIVE | COMMUNITY

## 2019-04-30 RX ORDER — CARVEDILOL 12.5 MG/1
12.5 TABLET, FILM COATED ORAL
Refills: 0 | Status: ACTIVE | COMMUNITY

## 2019-04-30 RX ORDER — NEBIVOLOL HYDROCHLORIDE 20 MG/1
TABLET ORAL
Refills: 0 | Status: COMPLETED | COMMUNITY
End: 2019-04-30

## 2019-05-13 NOTE — PHYSICAL EXAM
[Sclera] : the sclera and conjunctiva were normal [PERRL With Normal Accommodation] : pupils were equal in size, round, and reactive to light [Neck Appearance] : the appearance of the neck was normal [Auscultation Breath Sounds / Voice Sounds] : lungs were clear to auscultation bilaterally [Heart Rate And Rhythm] : heart rate was normal and rhythm regular [Heart Sounds] : normal S1 and S2 [Examination Of The Chest] : the chest was normal in appearance [2+] : left 2+ [Breast Appearance] : normal in appearance [Bowel Sounds] : normal bowel sounds [Abdomen Soft] : soft [Abdomen Tenderness] : non-tender [Cervical Lymph Nodes Enlarged Posterior Bilaterally] : posterior cervical [Cervical Lymph Nodes Enlarged Anterior Bilaterally] : anterior cervical [Supraclavicular Lymph Nodes Enlarged Bilaterally] : supraclavicular [No CVA Tenderness] : no ~M costovertebral angle tenderness [Abnormal Walk] : normal gait [Nail Clubbing] : no clubbing  or cyanosis of the fingernails [Musculoskeletal - Swelling] : no joint swelling seen [Motor Tone] : muscle strength and tone were normal [Skin Color & Pigmentation] : normal skin color and pigmentation [Skin Turgor] : normal skin turgor [] : no rash [No Focal Deficits] : no focal deficits [Oriented To Time, Place, And Person] : oriented to person, place, and time [Impaired Insight] : insight and judgment were intact [Affect] : the affect was normal [FreeTextEntry1] : deferred

## 2019-05-13 NOTE — ASSESSMENT
[FreeTextEntry1] : Ms. Rodrigues is a 63 year old female who presents today for follow up. She is s/p right VATS, RLL wedge resection, anatomic RLL superior segmentectomy, MLND on 2/15/18 with pathology revealing adenocarcinoma, papillary predominant, pT1bN0. Additionally, she is is s/p left thoracotomy, SAMANTHA lobectomy, lysis of adhesions, MLND on 9/3/2010 with Dr. 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 \par \par CT Chest in 4/19/19:\par -postop changes bilaterally with traction bronchiectasis and volume loss left upper lobe\par - no interval development of recurrent tumor or mass in lung fields\par \par I have reviewed the patient's medical records and diagnostic images at time of this office consultation and have recommended patient to return to office with CT Chest in 3 months.\par \par \par Written by Wing Chiara NP, acting as a scribe for Dr. Lyric Cameron.\par \par The documentation recorded by the scribe accurately reflects the service I personally performed and the decisions made by me. LYRIC CAMERON MD\par  \par

## 2019-05-13 NOTE — HISTORY OF PRESENT ILLNESS
[FreeTextEntry1] : Ms. Rodrigues is a 63 year old female who presents today for follow up. She is s/p right VATS, RLL wedge resection, anatomic RLL superior segmentectomy, MLND on 2/15/18 with pathology revealing adenocarcinoma, papillary predominant, pT1bN0. Additionally, she is is s/p left thoracotomy, SAMANTHA lobectomy, lysis of adhesions, MLND on 9/3/2010 with Dr. 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 \par \par Chest CT Scan 12/28/18 revealed:\par - mediastinum is shifted to the left secondary to prior left lung surgery\par - areas of traction bronchiectasis identified left upper zone, stable\par - associated pleural thickening left upper lung zone is unchanged, probably represents post radiation changes\par - no mediastinal or hilar lymphadenopathy is identified\par - post surgical clips are identified within the right infrahilar area extending into the superior segment of the right lower lobe. Fissural nodularity and thickening on the right is stable.\par - no interval development of new pulmonary nodule or suspicious area for metastatic disease\par - small pericardial effusion\par - small right hepatic cysts are identified\par \par CT Chest in 4/19/19:\par -postop changes bilaterally with traction bronchiectasis and volume loss left upper lobe\par - no interval development of recurrent tumor or mass in lung fields\par \par Pt reports she is feeling well and denies chest pain, shortness of breath, cough, fever, palpitations or unintentional weight loss.

## 2019-05-13 NOTE — DATA REVIEWED
pt, RN, surgery team [FreeTextEntry1] : CT Chest in 4/19/19:\par -postop changes bilaterally with traction bronchiectasis and volume loss left upper lobe\par - no interval development of recurrent tumor or mass in lung fields

## 2019-05-13 NOTE — CONSULT LETTER
[Dear  ___] : Dear  [unfilled], [Courtesy Letter:] : I had the pleasure of seeing your patient, [unfilled], in my office today. [Please see my note below.] : Please see my note below. [Sincerely,] : Sincerely, [DrSunil  ___] : Dr. GOMEZ [DrSunil ___] : Dr. GOMEZ [FreeTextEntry2] : Dr. Raymundo Smith (Onc/ref)\par Dr. Mendoza (PCP)\par Dr. Khanh Eddy (Cardio)  [FreeTextEntry3] : Raymundo Weldon MD, FACS \par Chief, Division of Thoracic Surgery \par Director, Minimally Invasive Thoracic Surgery \par Department of Cardiovascular and Thoracic Surgery \par [facility] \par , Cardiovascular and Thoracic Surgery \par [facility] [employer] [facility]

## 2019-08-19 ENCOUNTER — OUTPATIENT (OUTPATIENT)
Dept: OUTPATIENT SERVICES | Facility: HOSPITAL | Age: 64
LOS: 1 days | End: 2019-08-19
Payer: COMMERCIAL

## 2019-08-19 ENCOUNTER — APPOINTMENT (OUTPATIENT)
Dept: CT IMAGING | Facility: IMAGING CENTER | Age: 64
End: 2019-08-19
Payer: COMMERCIAL

## 2019-08-19 DIAGNOSIS — C34.90 MALIGNANT NEOPLASM OF UNSPECIFIED PART OF UNSPECIFIED BRONCHUS OR LUNG: ICD-10-CM

## 2019-08-19 DIAGNOSIS — Z90.2 ACQUIRED ABSENCE OF LUNG [PART OF]: Chronic | ICD-10-CM

## 2019-08-19 PROCEDURE — 71250 CT THORAX DX C-: CPT | Mod: 26

## 2019-08-19 PROCEDURE — 71250 CT THORAX DX C-: CPT

## 2019-08-27 ENCOUNTER — APPOINTMENT (OUTPATIENT)
Dept: THORACIC SURGERY | Facility: CLINIC | Age: 64
End: 2019-08-27
Payer: COMMERCIAL

## 2019-08-27 VITALS
BODY MASS INDEX: 24.99 KG/M2 | WEIGHT: 150 LBS | RESPIRATION RATE: 15 BRPM | OXYGEN SATURATION: 97 % | TEMPERATURE: 98.3 F | HEART RATE: 79 BPM | HEIGHT: 65 IN

## 2019-08-27 PROCEDURE — 99214 OFFICE O/P EST MOD 30 MIN: CPT

## 2019-09-02 NOTE — PHYSICAL EXAM
[Strabismus] : no strabismus was seen [PERRL With Normal Accommodation] : pupils were equal in size, round, and reactive to light [Sclera] : the sclera and conjunctiva were normal [Neck Appearance] : the appearance of the neck was normal [Jugular Venous Distention Increased] : there was no jugular-venous distention [] : no respiratory distress [Respiration, Rhythm And Depth] : normal respiratory rhythm and effort [Auscultation Breath Sounds / Voice Sounds] : lungs were clear to auscultation bilaterally [Heart Rate And Rhythm] : heart rate was normal and rhythm regular [Heart Sounds] : normal S1 and S2 [Murmurs] : no murmurs [Regular] : the rhythm was regular [Examination Of The Chest] : the chest was normal in appearance [No Abnormalities] : the abdominal aorta was not enlarged and no bruit was heard [2+] : left 2+ [Bowel Sounds] : normal bowel sounds [Abdomen Tenderness] : non-tender [Abdomen Soft] : soft [Abnormal Walk] : normal gait [No CVA Tenderness] : no ~M costovertebral angle tenderness [No Focal Deficits] : no focal deficits [Skin Turgor] : normal skin turgor [Skin Color & Pigmentation] : normal skin color and pigmentation [Impaired Insight] : insight and judgment were intact [Oriented To Time, Place, And Person] : oriented to person, place, and time [Right Carotid Bruit] : no bruit heard over the right carotid [Left Carotid Bruit] : no bruit heard over the left carotid [Right Femoral Bruit] : no bruit heard over the right femoral artery [Left Femoral Bruit] : no bruit heard over the left femoral artery [FreeTextEntry1] : Deferred

## 2019-09-02 NOTE — ASSESSMENT
[FreeTextEntry1] :  64 year old F presenting for a 3 month follow up visit. She is s/p right VATS, RLL wedge resection, anatomic RLL superior segmentectomy, MLND on 2/15/18 with pathology revealing adenocarcinoma, papillary predominant, pT1bN0. Additionally, she is is s/p left thoracotomy, SAMANTHA lobectomy, lysis of adhesions, MLND on 9/3/2010 with Dr. 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 \par \par She returns for routine follow up. She is doing well. No issues at this time.\par \par CT scan done on 8/19/2019 Showed:\par status post left upper lobectomy and right lower lobe superior segmentectomy with stable postsurgical changes. The remaining central airways are patent. Left perihilar consolidative opacity and posttreatment changes are unchanged. No new or enlarging pulmonary nodule. Emphysema. Stable Exam.\par \par I have reviewed the patient's medical records and diagnostic images during the time of this office visit, and I have made the following recommendation: \par \par Plan:\par 1. Return in 3 months with CT scan without contrast.\par \par Written by  Sona Jordan RN, acting as a scribe for  Dr. Raymundo Weldon.\par “The documentation recorded by the scribe accurately reflects the service I personally performed and the decisions made by me. By Dr. Raymundo Weldon.\par \par \par

## 2019-09-02 NOTE — HISTORY OF PRESENT ILLNESS
[FreeTextEntry1] : ROCIO AGUAYO is a 64 year old F presenting for a 3 month follow up visit. She is s/p right VATS, RLL wedge resection, anatomic RLL superior segmentectomy, MLND on 2/15/18 with pathology revealing adenocarcinoma, papillary predominant, pT1bN0. Additionally, she is is s/p left thoracotomy, SAMANTHA lobectomy, lysis of adhesions, MLND on 9/3/2010 with Dr. 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 \par \par CT scan done on 8/19/2019 Showed:\par status post left upper lobectomy and right lower lobe superior segmentectomy with stable postsurgical changes. The remaining central airways are patent. Left perihilar consolidative opacity and posttreatment changes are unchanged. No new or enlarging pulmonary nodule. Emphysema.\par Stable Exam.\par \par CT Chest in 4/19/19:\par -postop changes bilaterally with traction bronchiectasis and volume loss left upper lobe\par - no interval development of recurrent tumor or mass in lung fields\par \par Patient is doing well. The patient denies chest pain, shortness of breath, cough, hemoptysis, fever, palpitations, syncope, URI or recent illness.\par \par \par \par \par

## 2019-09-02 NOTE — CONSULT LETTER
[Courtesy Letter:] : I had the pleasure of seeing your patient, [unfilled], in my office today. [( Thank you for referring [unfilled] for consultation for _____ )] : Thank you for referring [unfilled] for consultation for [unfilled] [Please see my note below.] : Please see my note below. [Sincerely,] : Sincerely, [FreeTextEntry2] : Dr. Raymundo Smith (Onc/ref)\par Dr. Mendoza (PCP)\par Dr. Khanh Eddy (Cardio)  [FreeTextEntry3] : Raymundo Weldon MD, FACS \par Chief, Division of Thoracic Surgery \par Director, Minimally Invasive Thoracic Surgery \par Department of Cardiovascular and Thoracic Surgery \par [facility] \par , Cardiovascular and Thoracic Surgery\par \par

## 2019-12-23 ENCOUNTER — OUTPATIENT (OUTPATIENT)
Dept: OUTPATIENT SERVICES | Facility: HOSPITAL | Age: 64
LOS: 1 days | End: 2019-12-23
Payer: COMMERCIAL

## 2019-12-23 ENCOUNTER — APPOINTMENT (OUTPATIENT)
Dept: CT IMAGING | Facility: IMAGING CENTER | Age: 64
End: 2019-12-23
Payer: COMMERCIAL

## 2019-12-23 DIAGNOSIS — Z90.2 ACQUIRED ABSENCE OF LUNG [PART OF]: Chronic | ICD-10-CM

## 2019-12-23 DIAGNOSIS — C34.90 MALIGNANT NEOPLASM OF UNSPECIFIED PART OF UNSPECIFIED BRONCHUS OR LUNG: ICD-10-CM

## 2019-12-23 PROCEDURE — 71250 CT THORAX DX C-: CPT

## 2019-12-23 PROCEDURE — 71250 CT THORAX DX C-: CPT | Mod: 26

## 2020-01-14 ENCOUNTER — APPOINTMENT (OUTPATIENT)
Dept: THORACIC SURGERY | Facility: CLINIC | Age: 65
End: 2020-01-14
Payer: COMMERCIAL

## 2020-01-14 VITALS
DIASTOLIC BLOOD PRESSURE: 87 MMHG | RESPIRATION RATE: 16 BRPM | WEIGHT: 150 LBS | TEMPERATURE: 97.8 F | HEART RATE: 79 BPM | BODY MASS INDEX: 24.99 KG/M2 | SYSTOLIC BLOOD PRESSURE: 126 MMHG | HEIGHT: 65 IN | OXYGEN SATURATION: 96 %

## 2020-01-14 PROCEDURE — 99214 OFFICE O/P EST MOD 30 MIN: CPT

## 2020-01-14 NOTE — PHYSICAL EXAM
[Sclera] : the sclera and conjunctiva were normal [] : the neck was supple [Neck Appearance] : the appearance of the neck was normal [Neck Cervical Mass (___cm)] : no neck mass was observed [Respiration, Rhythm And Depth] : normal respiratory rhythm and effort [Auscultation Breath Sounds / Voice Sounds] : lungs were clear to auscultation bilaterally [Heart Rate And Rhythm] : heart rate was normal and rhythm regular [Examination Of The Chest] : the chest was normal in appearance [Heart Sounds] : normal S1 and S2 [Abdomen Soft] : soft [Abdomen Tenderness] : non-tender [Cervical Lymph Nodes Enlarged Posterior Bilaterally] : posterior cervical [Cervical Lymph Nodes Enlarged Anterior Bilaterally] : anterior cervical [Supraclavicular Lymph Nodes Enlarged Bilaterally] : supraclavicular [No CVA Tenderness] : no ~M costovertebral angle tenderness [Abnormal Walk] : normal gait [Skin Color & Pigmentation] : normal skin color and pigmentation [No Focal Deficits] : no focal deficits [Oriented To Time, Place, And Person] : oriented to person, place, and time [Affect] : the affect was normal [Impaired Insight] : insight and judgment were intact

## 2020-01-16 NOTE — ASSESSMENT
[FreeTextEntry1] : 64 year old F returns s/p right VATS, RLL wedge resection, anatomic RLL superior segmentectomy, MLND on 2/15/18 with pathology revealing adenocarcinoma, papillary predominant, pT1bN0. Additionally, she is is s/p left thoracotomy, SAMANTHA lobectomy, lysis of adhesions, MLND on 9/3/2010 with Dr. 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 \par \par CT Chest on 12/23/19 revealed:\par - There is trace pericardial fluid.  \par - S/P LULobectomy with architectural distortion and traction bronchiectasis in the left hemithorax. Post therapeutic changes along the periphery of the LLL without change. Partial RLL resection is also noted with minimal thickening along the suture line without change. No new consolidations, edema, effusion or pneumothorax. Centrilobular emphysema noted. \par \par I have reviewed the patient's medical records and diagnostic images at time of this office consultation and have made the following recommendation:\par 1.  Follow up in 3 months with noncontrast Chest CT.\par \par I personally performed the services described in the documentation, reviewed the documentation recorded by the scribe in my presence and it accurately and completely records my words and actions.\par \par I, Martina Jackson NP, am scribing for and the presence of Dr. Weldon, the following sections HISTORY OF PRESENT ILLNESS, PAST MEDICAL/FAMILY/SOCIAL HISTORY; REVIEW OF SYSTEMS; VITAL SIGNS; PHYSICAL EXAM; DISPOSITION.\par  \par \par \par

## 2020-01-16 NOTE — HISTORY OF PRESENT ILLNESS
[FreeTextEntry1] : ROCIO AGUAYO is a 64 year old F presenting for a 3 month follow up visit. She is s/p right VATS, RLL wedge resection, anatomic RLL superior segmentectomy, MLND on 2/15/18 with pathology revealing adenocarcinoma, papillary predominant, pT1bN0.  Tumor size 1.1 cm.   Additionally, she is is s/p left thoracotomy, SAMANTHA lobectomy, lysis of adhesions, MLND on 9/3/2010 with Dr. 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 \par \par She is a former smoker (quit 2010, 40 PYHx) PMHx includes HTN, HLD.  \par \par CT Chest on 12/23/19 revealed:\par - There is trace pericardial fluid.  \par - S/P LULobectomy with architectural distortion and traction bronchiectasis in the left hemithorax. Post therapeutic changes along the periphery of the LLL without change. Partial RLL resection is also noted with minimal thickening along the suture line without change. No new consolidations, edema, effusion or pneumothorax. Centrilobular emphysema noted. \par \par CT scan done on 8/19/2019 Showed:\par status post left upper lobectomy and right lower lobe superior segmentectomy with stable postsurgical changes. The remaining central airways are patent. Left perihilar consolidative opacity and posttreatment changes are unchanged. No new or enlarging pulmonary nodule. Emphysema.\par \par She returns today and reports that she generally feels well.   She admits to a cough in the AM which is productive of white sputum.  She denies any fatigue, fever, chills, shortness of breath, chest pain, hemoptysis, or recent illness.

## 2020-05-19 ENCOUNTER — RESULT REVIEW (OUTPATIENT)
Age: 65
End: 2020-05-19

## 2020-05-19 ENCOUNTER — OUTPATIENT (OUTPATIENT)
Dept: OUTPATIENT SERVICES | Facility: HOSPITAL | Age: 65
LOS: 1 days | End: 2020-05-19
Payer: COMMERCIAL

## 2020-05-19 ENCOUNTER — APPOINTMENT (OUTPATIENT)
Dept: CT IMAGING | Facility: IMAGING CENTER | Age: 65
End: 2020-05-19
Payer: COMMERCIAL

## 2020-05-19 DIAGNOSIS — C34.90 MALIGNANT NEOPLASM OF UNSPECIFIED PART OF UNSPECIFIED BRONCHUS OR LUNG: ICD-10-CM

## 2020-05-19 DIAGNOSIS — Z90.2 ACQUIRED ABSENCE OF LUNG [PART OF]: Chronic | ICD-10-CM

## 2020-05-19 PROCEDURE — 71250 CT THORAX DX C-: CPT

## 2020-05-19 PROCEDURE — 71250 CT THORAX DX C-: CPT | Mod: 26

## 2020-05-19 NOTE — CONSULT LETTER
[address] [facility] Good Help to Those in Need 
[phone] Social Work Admission Note Patient Name: Lauren Rajput YOB: 1924 Age: 81 y.o. Date of Visit: 20 Facility of Care: [facility] Patient Room: [facility]/01 Hospice Attending: Holli Estes MD 
Hospice Diagnosis: End stage heart failure ([address] [I50.84] Level of Care:  
 [x]  GIP []  Respite 
 []  Routine NARRATIVE  
LCSW met with patient and daughters at bedside. Patient appeared mostly comfortable but with somewhat labored, fast breathing being treated with medication. Patient is unresponsive at this time and daughters are spending time talking with patient and holding her hand. They shared some life review and expressed that she has been a wonderful mother and grandmother. Daughters are keeping grandchildren updated as they have also had a strong relationship with patient. LCSW will remain available for support and discharge planning as needed. ADVANCE CARE PLANNING Code Status: DNR Durable DNR: X Yes  _ No 
No flowsheet data found. Relationship Status: 
[]  Single    
[]       
[]     
[]  Domestic Partner    
[x]  / 
[]  Common Law 
[]   
[]  Unknown If in a relationship, name of partner/spouse: 
Duration of relationship: 
 
Protestant: Alevism  Home: [address] [place] [address] [phone] Resources Provided: supportive counseling Social Work Initial Assessment Gender: 
female Race/Ethnicity: (skip all that apply) []  [place]) or [place] Native 
[]   
[]  Black or [place] American 
[]   or  
[]   or [place] 
[x]  [place] 
[]  Unknown 
  
 Service:   
[]  Yes  
[x]  No      
[]  Unknown Appropriate for Pinning Ceremony:  
[]  Yes     
[]  No 
Is patient using VA benefits? []  Yes     
[]  No 
  
Primary Language: English 
[]   Needed 
[]   utilized during visit Ability to express thoughts/needs/feelings 
[]  Expressed thoughts/feelings/needs without difficulty 
[]  Requires extra time and cuing 
[]  Speech limited single words 
[]  Uses only gestures (eye, blinking eye or head movement/pointing) []  Unable to express thoughts/feelings/needs (speech unintelligible or inappropriate) [x]  Unresponsive Notes:  
  
Mental Status: 
[]  Alert-oriented to:   
 []  Person   
 []  Place   
 []  Time 
[]  Comatose-responds to:  
 []   Verbal stimuli  
 []  Tactile stimuli  
 []  Painful stimuli 
[]  Forgetful 
[]  Disoriented/Confused 
[x]  Lethargic 
[]  Agitated 
[]  Other (specify):   
Notes:  
  
Patients description of Illness/Current Health Status:   
[x]  Patient unable to discuss 
[]  Patient unwilling to discuss 
[]  (Specify) Knowledge/Understanding of Disease Process Patient:  
 []  Demonstrates knowledge/understanding of disease process 
 []  Demonstrates knowledge/understanding of treatment plan 
 []  Demonstrates knowledge/understanding of prognosis []  Demonstrates acceptance of prognosis []  Demonstrates knowledge/understanding of resuscitation status 
 []  Other (specify) Caregiver: 
 [x]  Demonstrates knowledge/understanding of disease process [x]  Demonstrates knowledge/understanding of treatment plan 
 [x]  Demonstrates knowledge/understanding of prognosis []  Demonstrates acceptance of prognosis []  Demonstrates knowledge/understanding of resuscitation status 
 []  Other (specify) Notes:  
  
Patients living arrangement/care setting: 
Use the PRIOR COLUMN when the PATIENTS current health status necessitated a change in his/her primary residence. Prior Current Response  
           []             []    Patients own home/residence []             []    Home of family member/friend []             []    Boarding home  
           []             []    Assisted living facility/half-way center [Dear  ___] : Dear  [unfilled], [Consult Letter:] : I had the pleasure of evaluating your patient, [unfilled]. []             [x]    Hospital/Acute care facility []             []    Skilled nursing facility []             []    Long term care facility/Nursing home  
           []             []    Hospice in Patient Primary Caregiver: 
[]  No Primary Caregiver Name of Primary Caregiver: Aidee Lan Relationship or Primary Caregiver:  
 []  Spouse/Significant other     
 [x]  Natural Child      
 []  Step child     
 []  Sibling 
 []  Parent 
 []  Friend/Neighbor 
 []  Community/Synagogue Volunteer 
 []  Paid help 
 []  Other (specify):___________ Notes:   
  
Family members/Significant others: 
Name: Brittanie Webber Relationship: daughter Phone Number: [phone] Actively involved in care? [x]  Yes  []  No 
 
Name: 
Relationship: 
Phone Number: Actively involved in care? []  Yes  []  No 
 
Name: 
Relationship: 
Phone Number: Actively involved in care? []  Yes  []  No 
 
Social support systems: (select ONE best description) [x]  Excellent social support system which includes three or more family members or friends 
[]  Good social support system which includes two or less members or friends 
[]  [address] support which includes one family member or friend 
[]  Poor social support; no family members or friends; basically ALONE Notes:  
  
Emotional Status: (skip all that apply) Patient Caregiver Response  
              []                [x]    Mood/Affect stable and appropriate    
              []                []    Angry  
              []                []    Anxious []                []    Apprehensive []                []    Avoidant  
              []                []    Clinging  
              []                []    Depressed  
              []                []    Distraught  
              []                []    Elated []                []    Euphoric  
              []                []    Fearful [( Thank you for referring [unfilled] for consultation for _____ )] : Thank you for referring [unfilled] for consultation for [unfilled] []                []    Flat Affect  
              []                []    Helpless []                []    Hostile []                []    Impulsive []                []    Irritable  
              []                []    Labile  
              []                []    Manic  
              []                []    Restlessness []                []    Sad  
              []                []    Suspicious []                []    Tearful  
              []                []    Withdrawn Notes:  
 
Coping Skills (strengths/weakness):  
 Patient: Coping Skills (strength/weakness):  
 Family/caregiver (strength/weakness): 
  
[place] of care (skip all that apply):    
[x]  No burden evident  
[]  Family must administer medications  
[]  Illness causing financial strain  
[]  Family/Support feels overwhelmed  
[]  Family/Support sleep disturbed with patients care  
[]  Patients care causes extra physical stress  of death 
[]  Illness causes changes in family lifestyle 
[]  Illness impacting family/support employment 
[]  Family experiencing increased time demands 
[]  Patients behavior endangers family 
[]  Denial of patients illness 
[]  Concern over outcome of illness/fear 
[]  Patients behavior embarrassing to family Notes:  
  
Risk Factors: (skip all that apply):   
[x]  No burden evident  
[]  Alcohol abuse 
[]  Financial resources inadequate to meet basic needs (food/house/etc) []  Financial resources inadequate to meet health care needs (supplies/equipment/medications) 
[]  Food/nutrition resources inadequate 
[]  Home environment unsafe/inadequate for home care 
[]  Homicidal risk 
[]  Lives alone or without concerned relatives 
[]  Multiple medications/complex schedule 
[]  Physical limitations increase likelihood of falls 
[]  Plan of care/treatments complicated 
[]  Substance use/abuse 
[]  Suicidal risk [Please see my note below.] : Please see my note below. []  Visual impairment threatens safety/ability to perform self-care 
[]  Other (specify): 
  
Abuse/Neglect (actual/potential risks): 
[x]  No signs of abuse/neglect 
[]  History of abuse/neglect                 []  [id number]          []  Sexual 
[]  History of domestic violence 
[]  Lacks adequate physical care 
[]  Lacks emotional nurturing/support 
[]  Lacks appropriate stimulation/cognitive experiences 
[]  Left alone inappropriately 
[]  Lacks necessary supervision 
[]  Inadequate or delayed medical care 
[]  Unsafe environment (i.e guns/drug use/history of violence in the home/etc.) []  Bruising or other physical signs of injury present 
[]  Other (specify): 
Notes:  
[]  Refer to child/adult protective services Current Sources of Stress (in Addition to Current Illness):  
[x]  None reported 
[]  Bills/Debt   
[]  Career/Job change   
[]   (short term)   
[]   (long term)   
[]  Death of a child (recent)   
[]  Death of a parent (recent)  
[]  Death of a spouse (recent)  
[]  Employment status changed  
[]  Family discord   
[]  Financial loss/Inadequate inther (specify):come 
[]  Job loss 
[]  Legal issues unresolved 
[]  Lifestyle change 
[]  Marital discord 
[]  Marriage within the last year 
[]  Paperwork (insurance/legal/etc) overwhelming 
[]  Separation/Divorce 
[]  Other (specify): 
Notes:  
  
Current Community Resources Being Utilized 1. [facility] for GIP care Interventions/Plan of Care 1. Assess social and emotional factors related to coping with end of life issues 2. Community resource planning/referral  
3. Relocation to different care setting if/when symptoms stabilize Discharge Planning 1.  home: [address] [place] [address] [phone] 2. Should patient stabilize can transition home with daughter, Edwin Olvera MSW Assessment Completed by: Marissa Walls 20 Time In: 1130 Time Out: 15 
 
 
 [Consult Closing:] : Thank you very much for allowing me to participate in the care of this patient.  If you have any questions, please do not hesitate to contact me. [Sincerely,] : Sincerely, [DrSunil  ___] : Dr. GOMEZ [DrSunil ___] : Dr. GOMEZ [FreeTextEntry2] : Dr. Raymundo Smith (Onc/ref)\par Dr. Mendoza (PCP)\par Dr. Khanh Eddy (Cardio) \par  [FreeTextEntry3] : Raymundo Weldon MD, FACS \par Chief, Division of Thoracic Surgery \par Director, Minimally Invasive Thoracic Surgery \par Department of Cardiovascular and Thoracic Surgery \par [facility] \par , Cardiovascular and Thoracic Surgery\par

## 2020-06-09 ENCOUNTER — APPOINTMENT (OUTPATIENT)
Dept: THORACIC SURGERY | Facility: CLINIC | Age: 65
End: 2020-06-09
Payer: COMMERCIAL

## 2020-06-09 PROCEDURE — 99214 OFFICE O/P EST MOD 30 MIN: CPT

## 2020-06-09 PROCEDURE — 99443: CPT

## 2020-06-09 NOTE — HISTORY OF PRESENT ILLNESS
[Home] : at home, [unfilled] , at the time of the visit. [Medical Office: ([facility])___] : at the medical office located in  [Verbal consent obtained from patient] : the patient, [unfilled] [FreeTextEntry4] : Tash Mac NP [FreeTextEntry1] : 64 year old F presenting for a 3 month follow up visit via telehealth. She is a former smoker (quit 2010, 41 PYHx) PMHx includes HTN, HLD. \par \par She is s/p right VATS, RLL wedge resection, anatomic RLL superior segmentectomy, MLND on 2/15/18 with pathology revealing adenocarcinoma, papillary predominant, pT1bN0. Tumor size 1.1 cm. Additionally, she is is s/p left thoracotomy, SAMANTHA lobectomy, lysis of adhesions, MLND on 9/3/2010 with Dr. 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 \par \par CT Chest on 12/23/19 revealed:\par -There is trace pericardial fluid. \par -S/P LULobectomy with architectural distortion and traction bronchiectasis in the left hemithorax. \par -Post therapeutic changes along the periphery of the LLL without change. \par -Partial RLL resection is also noted with minimal thickening along the suture line without change. \par -No new consolidations, edema, effusion or pneumothorax. Centrilobular emphysema noted. \par \par CT chest scan 5/19/2020:\par - LLL paramediastinal opacities and bronchiectasis are secondary to radiation treatment

## 2020-06-09 NOTE — CONSULT LETTER
[Courtesy Letter:] : I had the pleasure of seeing your patient, [unfilled], in my office today. [Consult Closing:] : Thank you very much for allowing me to participate in the care of this patient.  If you have any questions, please do not hesitate to contact me. [Please see my note below.] : Please see my note below. [Sincerely,] : Sincerely, [FreeTextEntry3] : \par \par \par Raymundo Weldon MD, FACS \par Chief, Division of Thoracic Surgery \par Director, Minimally Invasive Thoracic Surgery \par Department of Cardiovascular and Thoracic Surgery \par [facility] \par , Cardiovascular and Thoracic Surgery [FreeTextEntry2] : Dr. Raymundo Smith (Onc/ref)\par Dr. Mendoza (PCP)\par Dr. Khanh Eddy (Cardio) \par

## 2020-06-09 NOTE — ASSESSMENT
[FreeTextEntry1] : 64 year old F presenting for a 3 month follow up visit via telehealth. \par \par She is s/p right VATS, RLL wedge resection, anatomic RLL superior segmentectomy, MLND on 2/15/18 with pathology revealing adenocarcinoma, papillary predominant, pT1bN0. Tumor size 1.1 cm. Additionally, she is is s/p left thoracotomy, SAMANTHA lobectomy, lysis of adhesions, MLND on 9/3/2010 with Dr. 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 \par \par CT chest scan 5/19/2020:\par - LLL paramediastinal opacities and bronchiectasis are secondary to radiation treatment\par \par I have reviewed the patient's medical records and diagnostic images during the time of this office visit, and I have made the following recommendation:\par 1.  Return to office for follow up with CT Chest in 6 months\par \par \par I personally performed the services described in the documentation, reviewed the documentation recorded by the scribe in my presence and it accurately and completely records my words and actions.\par \par I, Tash Mac, am scribing for and the presence of LYRIC Del Rio the following sections HISTORY OF PRESENT ILLNESSES, PAST MEDICAL/FAMILY/SOCIAL HISTORY; REVIEW OF SYSTEMS; VITAL SIGNS; PHYSICAL EXAM; DISPOSITION.

## 2020-12-02 ENCOUNTER — OUTPATIENT (OUTPATIENT)
Dept: OUTPATIENT SERVICES | Facility: HOSPITAL | Age: 65
LOS: 1 days | End: 2020-12-02
Payer: COMMERCIAL

## 2020-12-02 ENCOUNTER — RESULT REVIEW (OUTPATIENT)
Age: 65
End: 2020-12-02

## 2020-12-02 ENCOUNTER — APPOINTMENT (OUTPATIENT)
Dept: CT IMAGING | Facility: IMAGING CENTER | Age: 65
End: 2020-12-02
Payer: COMMERCIAL

## 2020-12-02 DIAGNOSIS — Z90.2 ACQUIRED ABSENCE OF LUNG [PART OF]: Chronic | ICD-10-CM

## 2020-12-02 DIAGNOSIS — C34.90 MALIGNANT NEOPLASM OF UNSPECIFIED PART OF UNSPECIFIED BRONCHUS OR LUNG: ICD-10-CM

## 2020-12-02 PROCEDURE — 71250 CT THORAX DX C-: CPT

## 2020-12-02 PROCEDURE — 71250 CT THORAX DX C-: CPT | Mod: 26

## 2020-12-15 ENCOUNTER — APPOINTMENT (OUTPATIENT)
Dept: THORACIC SURGERY | Facility: CLINIC | Age: 65
End: 2020-12-15
Payer: COMMERCIAL

## 2020-12-22 ENCOUNTER — APPOINTMENT (OUTPATIENT)
Dept: THORACIC SURGERY | Facility: CLINIC | Age: 65
End: 2020-12-22
Payer: COMMERCIAL

## 2020-12-22 VITALS
HEART RATE: 76 BPM | SYSTOLIC BLOOD PRESSURE: 122 MMHG | WEIGHT: 145 LBS | OXYGEN SATURATION: 95 % | RESPIRATION RATE: 17 BRPM | HEIGHT: 65 IN | DIASTOLIC BLOOD PRESSURE: 86 MMHG | BODY MASS INDEX: 24.16 KG/M2

## 2020-12-22 PROCEDURE — 99214 OFFICE O/P EST MOD 30 MIN: CPT

## 2020-12-22 PROCEDURE — 99072 ADDL SUPL MATRL&STAF TM PHE: CPT

## 2020-12-22 NOTE — DATA REVIEWED
[FreeTextEntry1] : CT chest on 12/02/2020:\par - post-op changes\par - left perihilar postradiation changes are stable

## 2020-12-22 NOTE — PHYSICAL EXAM
[] : no respiratory distress [Respiration, Rhythm And Depth] : normal respiratory rhythm and effort [Exaggerated Use Of Accessory Muscles For Inspiration] : no accessory muscle use [Auscultation Breath Sounds / Voice Sounds] : lungs were clear to auscultation bilaterally [Apical Impulse] : the apical impulse was normal [Heart Sounds] : normal S1 and S2 [2+] : left 2+ [Oriented To Time, Place, And Person] : oriented to person, place, and time

## 2020-12-22 NOTE — END OF VISIT
[FreeTextEntry3] : I, AUSTIN KATE , am scribing for and in the presence of LYRIC CAMERON the following sections: history of present illness, past medical/family/surgical/family/social history, review of systems, vital signs, physical exam, and disposition.\par

## 2020-12-22 NOTE — HISTORY OF PRESENT ILLNESS
[FreeTextEntry1] : 65 year old female, former smoker (quit 2010, 41 PYHx) PMHx includes HTN, HLD. \par \par She is s/p right VATS, RLL wedge resection, anatomic RLL superior segmentectomy, MLND on 2/15/18 with pathology revealing adenocarcinoma, papillary predominant, pT1bN0. Tumor size 1.1 cm. Additionally, she is is s/p left thoracotomy, SAMANTHA lobectomy, lysis of adhesions, MLND on 9/3/2010 with Dr. 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 \par \par CT Chest on 12/23/19 revealed:\par -There is trace pericardial fluid. \par -S/P LULobectomy with architectural distortion and traction bronchiectasis in the left hemithorax. \par -Post therapeutic changes along the periphery of the LLL without change. \par -Partial RLL resection is also noted with minimal thickening along the suture line without change. \par -No new consolidations, edema, effusion or pneumothorax. Centrilobular emphysema noted. \par \par CT chest scan 5/19/2020:\par - LLL paramediastinal opacities and bronchiectasis are secondary to radiation treatment \par \par CT chest on 12/02/2020:\par - post-op changes\par - left perihilar postradiation changes are stable\par \par Patient is here today for a follow up. \par

## 2020-12-22 NOTE — CONSULT LETTER
[FreeTextEntry2] : Dr. Raymundo Smith (Onc/ref)\par Dr. Mendoza (PCP)\par Dr. Khanh Eddy (Cardio)  [FreeTextEntry3] : Raymundo Weldon MD, FACS \par Chief, Division of Thoracic Surgery \par Director, Minimally Invasive Thoracic Surgery \par Department of Cardiovascular and Thoracic Surgery \par [facility] \par , Cardiovascular and Thoracic Surgery\par \par

## 2020-12-22 NOTE — ASSESSMENT
[FreeTextEntry1] : 65 year old female, former smoker (quit 2010, 41 PYHx) PMHx includes HTN, HLD. \par \par She is s/p right VATS, RLL wedge resection, anatomic RLL superior segmentectomy, MLND on 2/15/18 with pathology revealing adenocarcinoma, papillary predominant, pT1bN0. Tumor size 1.1 cm. Additionally, she is is s/p left thoracotomy, SAMANTHA lobectomy, lysis of adhesions, MLND on 9/3/2010 with Dr. 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 \par \par CT chest scan 5/19/2020:\par - LLL paramediastinal opacities and bronchiectasis are secondary to radiation treatment \par \par CT chest on 12/02/2020:\par - post-op changes\par - left perihilar postradiation changes are stable\par \par Patient doing well. Denies SOB, cough, hemoptysis, chest discomfort, fever/chills. \par CT Chest 12/2/20 reviewed with patient. CT stable, no clinical or radiographic evidence of recurrent disease. \par \par I have reviewed the patient's medical records and diagnostic images at time of this office consultation and have made the following recommendation:\par 1. CT Chest in 6 months\par \par

## 2021-06-14 ENCOUNTER — TRANSCRIPTION ENCOUNTER (OUTPATIENT)
Age: 66
End: 2021-06-14

## 2021-06-14 ENCOUNTER — APPOINTMENT (OUTPATIENT)
Dept: CT IMAGING | Facility: IMAGING CENTER | Age: 66
End: 2021-06-14
Payer: COMMERCIAL

## 2021-06-14 ENCOUNTER — OUTPATIENT (OUTPATIENT)
Dept: OUTPATIENT SERVICES | Facility: HOSPITAL | Age: 66
LOS: 1 days | End: 2021-06-14
Payer: MEDICARE

## 2021-06-14 DIAGNOSIS — Z90.2 ACQUIRED ABSENCE OF LUNG [PART OF]: Chronic | ICD-10-CM

## 2021-06-14 DIAGNOSIS — Z00.8 ENCOUNTER FOR OTHER GENERAL EXAMINATION: ICD-10-CM

## 2021-06-14 DIAGNOSIS — C34.90 MALIGNANT NEOPLASM OF UNSPECIFIED PART OF UNSPECIFIED BRONCHUS OR LUNG: ICD-10-CM

## 2021-06-14 PROCEDURE — 71250 CT THORAX DX C-: CPT

## 2021-06-14 PROCEDURE — 71250 CT THORAX DX C-: CPT | Mod: 26

## 2021-06-21 NOTE — CONSULT LETTER
Prescription approved per [facility] Refill Protocol.    Lina ALBRIGHT RN  EP Triage     [Dear  ___] : Dear  [unfilled], [Courtesy Letter:] : I had the pleasure of seeing your patient, [unfilled], in my office today. [Please see my note below.] : Please see my note below. [Sincerely,] : Sincerely, [FreeTextEntry2] : Dr. Raymundo Smith (Onc/ref)\par Dr. Mendoza (PCP)\par Dr. Khanh Eddy (Cardio) \par \par  [FreeTextEntry3] : \par \par \par Raymundo Weldon MD, FACS \par Chief, Division of Thoracic Surgery \par Director, Minimally Invasive Thoracic Surgery \par Department of Cardiovascular and Thoracic Surgery \par [facility] \par , Cardiovascular and Thoracic Surgery

## 2021-06-22 ENCOUNTER — APPOINTMENT (OUTPATIENT)
Dept: THORACIC SURGERY | Facility: CLINIC | Age: 66
End: 2021-06-22
Payer: COMMERCIAL

## 2021-06-22 VITALS
HEART RATE: 67 BPM | TEMPERATURE: 98.1 F | WEIGHT: 150 LBS | BODY MASS INDEX: 24.99 KG/M2 | DIASTOLIC BLOOD PRESSURE: 90 MMHG | OXYGEN SATURATION: 94 % | SYSTOLIC BLOOD PRESSURE: 140 MMHG | HEIGHT: 65 IN

## 2021-06-22 PROCEDURE — 99214 OFFICE O/P EST MOD 30 MIN: CPT

## 2021-06-22 NOTE — DATA REVIEWED
[FreeTextEntry1] : I have independently reviewed the medical records and imaging at the time of this office consultation, and discussed the following interpretations and recommendations with the patient:\par - CT chest on 12/2/2020\par - CT chest on 6/14/21

## 2021-06-22 NOTE — CONSULT LETTER
[Dear  ___] : Dear  [unfilled], [Courtesy Letter:] : I had the pleasure of seeing your patient, [unfilled], in my office today. [Please see my note below.] : Please see my note below. [Sincerely,] : Sincerely, [DrSunil  ___] : Dr. GOMEZ [FreeTextEntry2] : Dr. Raymundo Smith (Onc/ref)\par Dr. Mendoza (PCP)\par Dr. Khanh Eddy (Cardio) \par \par  [FreeTextEntry3] : Raymundo Weldon MD, FACS \par Chief, Division of Thoracic Surgery \par Director, Minimally Invasive Thoracic Surgery \par Department of Cardiovascular and Thoracic Surgery \par [facility] \par , Cardiovascular and Thoracic Surgery\par \par \par

## 2021-06-22 NOTE — HISTORY OF PRESENT ILLNESS
[FreeTextEntry1] : 65 year old female, former smoker (quit 2010, 41 PYHx) PMHx includes HTN, HLD. \par \par She is s/p right VATS, RLL wedge resection, anatomic RLL superior segmentectomy, MLND on 2/15/18 with pathology revealing adenocarcinoma, papillary predominant, pT1bN0. Tumor size 1.1 cm. Additionally, she is is s/p left thoracotomy, SAMANTHA lobectomy, lysis of adhesions, MLND on 9/3/2010 with Dr. 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 \par \par CT chest scan 5/19/2020:\par - LLL paramediastinal opacities and bronchiectasis are secondary to radiation treatment \par \par CT chest on 12/02/2020:\par - post-op changes\par - left perihilar postradiation changes are stable\par \par CT chest on 6/14/21: \par - s/p LULobectomy with stable post op and post radiation therapy changes\par \par Patient presents to office for follow up. Patient denies worsening shortness of breath, cough, chest pain, fever, chills, unintentional weight loss.

## 2021-06-22 NOTE — ASSESSMENT
[FreeTextEntry1] : 65 year old female, former smoker (quit 2010, 41 PYHx) PMHx includes HTN, HLD. \par \par She is s/p right VATS, RLL wedge resection, anatomic RLL superior segmentectomy, MLND on 2/15/18 with pathology revealing adenocarcinoma, papillary predominant, pT1bN0. Tumor size 1.1 cm. Additionally, she is is s/p left thoracotomy, SAMANTHA lobectomy, lysis of adhesions, MLND on 9/3/2010 with Dr. 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 \par \par I have independently reviewed the medical records and imaging at the time of this office consultation, and discussed the following interpretations and recommendations with the patient:\par 1. CT chest reviewed and explained to patient, patient is doing well, I recommended patient to return to office in 6 months with CT Chest without contrast. \par \par Recommendations reviewed with patient during this office visit, and all questions answered; Patient instructed on the importance of follow up and verbalizes understanding.\par \par I personally performed the services described in the documentation, reviewed the documentation recorded by the scribe in my presence and it accurately and completely records my words and actions.\par \par I, MOLLY Steel, am scribing for and in the presence of LYRIC Del [place], the following sections HISTORY OF PRESENT ILLNESS, PAST MEDICAL/FAMILY/SOCIAL HISTORY; REVIEW OF SYSTEMS; VITAL SIGNS; PHYSICAL EXAM; DISPOSITION.\par  \par

## 2021-12-15 ENCOUNTER — OUTPATIENT (OUTPATIENT)
Dept: OUTPATIENT SERVICES | Facility: HOSPITAL | Age: 66
LOS: 1 days | End: 2021-12-15
Payer: MEDICARE

## 2021-12-15 ENCOUNTER — APPOINTMENT (OUTPATIENT)
Dept: CT IMAGING | Facility: IMAGING CENTER | Age: 66
End: 2021-12-15

## 2021-12-15 DIAGNOSIS — C34.90 MALIGNANT NEOPLASM OF UNSPECIFIED PART OF UNSPECIFIED BRONCHUS OR LUNG: ICD-10-CM

## 2021-12-15 DIAGNOSIS — Z90.2 ACQUIRED ABSENCE OF LUNG [PART OF]: Chronic | ICD-10-CM

## 2021-12-15 PROCEDURE — 71250 CT THORAX DX C-: CPT

## 2021-12-15 PROCEDURE — 71250 CT THORAX DX C-: CPT | Mod: 26

## 2021-12-21 ENCOUNTER — APPOINTMENT (OUTPATIENT)
Dept: THORACIC SURGERY | Facility: CLINIC | Age: 66
End: 2021-12-21
Payer: COMMERCIAL

## 2021-12-21 VITALS
WEIGHT: 133 LBS | RESPIRATION RATE: 18 BRPM | SYSTOLIC BLOOD PRESSURE: 99 MMHG | BODY MASS INDEX: 21.89 KG/M2 | HEART RATE: 83 BPM | HEIGHT: 65.25 IN | OXYGEN SATURATION: 96 % | DIASTOLIC BLOOD PRESSURE: 77 MMHG

## 2021-12-21 PROCEDURE — 99214 OFFICE O/P EST MOD 30 MIN: CPT

## 2021-12-21 NOTE — CONSULT LETTER
[Dear  ___] : Dear  [unfilled], [Courtesy Letter:] : I had the pleasure of seeing your patient, [unfilled], in my office today. [Please see my note below.] : Please see my note below. [Sincerely,] : Sincerely, [FreeTextEntry2] : Dr. Raymundo Smith (Onc/ref)\par Dr. Mendoza (PCP)\par Dr. Khanh Eddy (Cardio) \par \par  [FreeTextEntry3] : Raymundo Weldon MD, FACS \par Chief, Division of Thoracic Surgery \par Director, Minimally Invasive Thoracic Surgery \par Department of Cardiovascular and Thoracic Surgery \par [facility] \par , Cardiovascular and Thoracic Surgery\par \par \par  [DrSunil  ___] : Dr. GOMEZ

## 2021-12-21 NOTE — ASSESSMENT
[FreeTextEntry1] : 66 year old female, former smoker (quit 2010, 40 PYHx) PMHx includes HTN, HLD. \par \par She is s/p right VATS, RLL wedge resection, anatomic RLL superior segmentectomy, MLND on 2/15/18 with pathology revealing adenocarcinoma, papillary predominant, pT1bN0. Tumor size 1.1 cm. Additionally, she is is s/p left thoracotomy, SAMANTHA lobectomy, lysis of adhesions, MLND on 9/3/2010 with Dr. 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 \par \par I have independently reviewed the medical records and imaging at the time of this office consultation, and discussed the following interpretations and recommendations with the patient:\par 1. CT chest reviewed and explained to patient, stable scan. I recommended patient to return to office in 6 months with CT Chest without contrast. \par \par I personally performed the services described in the documentation, reviewed the documentation recorded by the scribe in my presence and it accurately and completely records my words and actions.\par \par I, MOLLY Cortez, am scribing for and in the presence of LYRIC Del Rio, the following sections HISTORY OF PRESENT ILLNESS, PAST MEDICAL/FAMILY/SOCIAL HISTORY; REVIEW OF SYSTEMS; VITAL SIGNS; PHYSICAL EXAM; DISPOSITION.\par  \par  \par

## 2021-12-21 NOTE — HISTORY OF PRESENT ILLNESS
[FreeTextEntry1] : 66 year old female, former smoker (quit 2010, 40 PYHx) PMHx includes HTN, HLD. \par \par She is s/p right VATS, RLL wedge resection, anatomic RLL superior segmentectomy, MLND on 2/15/18 with pathology revealing adenocarcinoma, papillary predominant, pT1bN0. Tumor size 1.1 cm. Additionally, she is is s/p left thoracotomy, SAMANTHA lobectomy, lysis of adhesions, MLND on 9/3/2010 with Dr. 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 \par \par CT chest scan 5/19/2020:\par - LLL paramediastinal opacities and bronchiectasis are secondary to radiation treatment \par \par CT chest on 12/02/2020:\par - post-op changes\par - left perihilar postradiation changes are stable\par \par CT chest on 6/14/21: \par - s/p LULobectomy with stable post op and post radiation therapy changes\par \par CT Chest on 12/15/21:\par - s/p SAMANTHA lobectomy\par - LLL paramediastinal consolidation is unchanged and is likely the sequelae of prior radiation treatment\par \par Patient presents to office for follow up.

## 2022-06-14 ENCOUNTER — OUTPATIENT (OUTPATIENT)
Dept: OUTPATIENT SERVICES | Facility: HOSPITAL | Age: 67
LOS: 1 days | End: 2022-06-14
Payer: MEDICARE

## 2022-06-14 ENCOUNTER — APPOINTMENT (OUTPATIENT)
Dept: CT IMAGING | Facility: IMAGING CENTER | Age: 67
End: 2022-06-14
Payer: COMMERCIAL

## 2022-06-14 DIAGNOSIS — Z00.8 ENCOUNTER FOR OTHER GENERAL EXAMINATION: ICD-10-CM

## 2022-06-14 DIAGNOSIS — C34.90 MALIGNANT NEOPLASM OF UNSPECIFIED PART OF UNSPECIFIED BRONCHUS OR LUNG: ICD-10-CM

## 2022-06-14 DIAGNOSIS — Z90.2 ACQUIRED ABSENCE OF LUNG [PART OF]: Chronic | ICD-10-CM

## 2022-06-14 PROCEDURE — 71250 CT THORAX DX C-: CPT

## 2022-06-14 PROCEDURE — 71250 CT THORAX DX C-: CPT | Mod: 26

## 2022-06-21 ENCOUNTER — APPOINTMENT (OUTPATIENT)
Dept: THORACIC SURGERY | Facility: CLINIC | Age: 67
End: 2022-06-21
Payer: SELF-PAY

## 2022-06-21 VITALS
BODY MASS INDEX: 20.16 KG/M2 | OXYGEN SATURATION: 94 % | HEART RATE: 71 BPM | SYSTOLIC BLOOD PRESSURE: 100 MMHG | WEIGHT: 121 LBS | HEIGHT: 65 IN | DIASTOLIC BLOOD PRESSURE: 75 MMHG

## 2022-06-21 PROCEDURE — 99214 OFFICE O/P EST MOD 30 MIN: CPT

## 2022-06-21 RX ORDER — ROSUVASTATIN CALCIUM 10 MG/1
10 TABLET, FILM COATED ORAL
Qty: 90 | Refills: 0 | Status: ACTIVE | COMMUNITY
Start: 2021-08-27

## 2022-06-21 NOTE — ASSESSMENT
[FreeTextEntry1] : Ms. Rodrigues is a 66 year old female, former smoker (quit 2010, 40 PYHx) PMHx includes HTN, HLD. \par \par She is s/p right VATS, RLL wedge resection, anatomic RLL superior segmentectomy, MLND on 2/15/18 with pathology revealing adenocarcinoma, papillary predominant, pT1bN0. Tumor size 1.1 cm. Additionally, she is is s/p left thoracotomy, SAMANTHA lobectomy, lysis of adhesions, MLND on 9/3/2010 with Dr. 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 \par \par CT Chest on 6/14/2022:\par - Status post left upper lobectomy as well as wedge resection in the right lower lobe\par - No pulmonary nodules are noted\par \par I have independently reviewed the medical records and imaging at the time of this office consultation, and discussed the following interpretations and recommendations with the patient:\par - Return to office for follow up with CT Chest in 6 months\par Recommendations reviewed with patient during this office visit, and all questions answered; Patient instructed on the importance of follow up and verbalizes understanding.\par \par I personally performed the services described in the documentation, reviewed the documentation recorded by the scribe in my presence and it accurately and completely records my words and actions.\par \par I, Tash Mac, am scribing for and the presence of LYRIC Del Rio the following sections HISTORY OF PRESENT ILLNESSES, PAST MEDICAL/FAMILY/SOCIAL HISTORY; REVIEW OF SYSTEMS; VITAL SIGNS; PHYSICAL EXAM; DISPOSITION.

## 2022-06-21 NOTE — CONSULT LETTER
[Courtesy Letter:] : I had the pleasure of seeing your patient, [unfilled], in my office today. [Please see my note below.] : Please see my note below. [Consult Closing:] : Thank you very much for allowing me to participate in the care of this patient.  If you have any questions, please do not hesitate to contact me. [Sincerely,] : Sincerely, [FreeTextEntry2] : Dr. Raymundo Smith (Onc/ref)\par Dr. Mendoza (PCP)\par Dr. Khanh Eddy (Cardio)  [FreeTextEntry3] : \par \par \par Raymundo Weldon MD, FACS \par Chief, Division of Thoracic Surgery \par Director, Minimally Invasive Thoracic Surgery \par Department of Cardiovascular and Thoracic Surgery \par [facility] \par , Cardiovascular and Thoracic Surgery

## 2022-06-21 NOTE — PHYSICAL EXAM
[Sclera] : the sclera and conjunctiva were normal [PERRL With Normal Accommodation] : pupils were equal in size, round, and reactive to light [Neck Appearance] : the appearance of the neck was normal [] : no respiratory distress [Respiration, Rhythm And Depth] : normal respiratory rhythm and effort [Auscultation Breath Sounds / Voice Sounds] : lungs were clear to auscultation bilaterally [Heart Sounds] : normal S1 and S2 [Murmurs] : no murmurs [Examination Of The Chest] : the chest was normal in appearance [Abdomen Soft] : soft [Abdomen Tenderness] : non-tender [Cervical Lymph Nodes Enlarged Posterior Bilaterally] : posterior cervical [Cervical Lymph Nodes Enlarged Anterior Bilaterally] : anterior cervical [Supraclavicular Lymph Nodes Enlarged Bilaterally] : supraclavicular [Abnormal Walk] : normal gait [Skin Color & Pigmentation] : normal skin color and pigmentation [Skin Turgor] : normal skin turgor [No Focal Deficits] : no focal deficits [Oriented To Time, Place, And Person] : oriented to person, place, and time [Affect] : the affect was normal [Mood] : the mood was normal

## 2022-06-21 NOTE — HISTORY OF PRESENT ILLNESS
[FreeTextEntry1] : Ms. Rodrigues is a 66 year old female, former smoker (quit 2010, 40 PYHx) PMHx includes HTN, HLD. \par \par She is s/p right VATS, RLL wedge resection, anatomic RLL superior segmentectomy, MLND on 2/15/18 with pathology revealing adenocarcinoma, papillary predominant, pT1bN0. Tumor size 1.1 cm. Additionally, she is is s/p left thoracotomy, SAMANTHA lobectomy, lysis of adhesions, MLND on 9/3/2010 with Dr. 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 \par \par CT chest scan 5/19/2020:\par - LLL paramediastinal opacities and bronchiectasis are secondary to radiation treatment \par \par CT chest on 12/02/2020:\par - post-op changes\par - left perihilar postradiation changes are stable\par \par CT chest on 6/14/21: \par - s/p LULobectomy with stable post op and post radiation therapy changes\par \par CT Chest on 12/15/21:\par - s/p SAMANTHA lobectomy\par - LLL paramediastinal consolidation is unchanged and is likely the sequelae of prior radiation treatment\par \par CT Chest on 6/14/2022:\par - No endobronchial lesions, no hilar and/or mediastinal adenopathy noted\par - Small pericardial effusion noted, no pleural effusions are noted\par - Patient is status post left upper lobectomy as well as wedge resection in the right lower lobe\par - Postradiation therapy changes are present in the left lower lobe, unchanged\par - Emphysematous changes are present in both lungs\par \par Patient presents to office for follow up.  She denies any fever, chills, cough, shortness of breath, chest pain, hemoptysis, or recent illness.   She reports recent 15 lb weight loss over 6 months secondary to decreased appetite after loss of  earlier this year.

## 2022-12-09 NOTE — H&P PST ADULT - SPIRITUAL FAITH, PROFILE
Subjective:      Rikki Quevedo is a 9 y.o. male here with mother. Patient brought in for Well Child (* YEAR WELL VISIT.)      History of Present Illness:  Well Child Exam  Diet - WNL - Diet includes family meals and cow's milk   Growth, Elimination, Sleep - WNL -  Voiding normal, stooling normal and sleeping normal  Physical Activity - WNL - active play time  Behavior - WNL -  School - normal -good peer interactions and satisfactory academic performance  Household/Safety - WNL - support present for parents, adult support for patient, safe environment and appropriate carseat/belt use    Review of Systems   Constitutional:  Negative for activity change, appetite change, fatigue, fever and unexpected weight change.   HENT:  Negative for congestion, ear pain, rhinorrhea, sneezing and sore throat.    Eyes:  Negative for discharge and redness.   Respiratory:  Negative for apnea, cough, shortness of breath and wheezing.    Gastrointestinal:  Negative for abdominal pain, blood in stool, constipation, diarrhea and vomiting.   Genitourinary:  Negative for dysuria, frequency and hematuria.   Musculoskeletal:  Negative for arthralgias, back pain and myalgias.   Skin:  Negative for rash.   Neurological:  Negative for seizures, syncope, light-headedness and headaches.   Hematological:  Negative for adenopathy.   Psychiatric/Behavioral:  Negative for behavioral problems and sleep disturbance.      Objective:     Physical Exam  Vitals and nursing note reviewed. Exam conducted with a chaperone present.   Constitutional:       General: He is active.      Appearance: Normal appearance. He is well-developed and normal weight.   HENT:      Right Ear: Tympanic membrane normal.      Left Ear: Tympanic membrane normal.      Nose: Nose normal.      Mouth/Throat:      Mouth: Mucous membranes are moist.      Pharynx: Oropharynx is clear.      Tonsils: No tonsillar exudate.      Comments: Small protrusion at base of right side of  uvula.  Eyes:      Conjunctiva/sclera: Conjunctivae normal.      Pupils: Pupils are equal, round, and reactive to light.   Cardiovascular:      Rate and Rhythm: Normal rate and regular rhythm.      Pulses: Pulses are strong.      Heart sounds: S1 normal and S2 normal. No murmur heard.  Pulmonary:      Effort: Pulmonary effort is normal. No respiratory distress or retractions.      Breath sounds: Normal breath sounds and air entry.   Abdominal:      General: Bowel sounds are normal. There is no distension.      Palpations: Abdomen is soft. There is no mass.      Tenderness: There is no abdominal tenderness. There is no guarding or rebound.      Hernia: No hernia is present.   Musculoskeletal:         General: No deformity or signs of injury. Normal range of motion.      Cervical back: Normal range of motion and neck supple.   Lymphadenopathy:      Cervical: No cervical adenopathy.   Skin:     General: Skin is warm.      Capillary Refill: Capillary refill takes less than 2 seconds.      Findings: No rash.   Neurological:      Mental Status: He is alert.      Cranial Nerves: No cranial nerve deficit.      Deep Tendon Reflexes: Reflexes normal.       Assessment:        1. Encounter for well child check without abnormal findings    2. Lesion of uvula           Plan:      Rikki was seen today for well child.    Diagnoses and all orders for this visit:    Encounter for well child check without abnormal findings  -     Flu Vaccine - Quadrivalent *Preferred* (PF) (6 months & older)    Lesion of uvula      Monitor growth on uvula. Notify if not improving over the next month.  Dietary counselling and anticipatory guidance for age provided.          yes

## 2022-12-13 ENCOUNTER — NON-APPOINTMENT (OUTPATIENT)
Age: 67
End: 2022-12-13

## 2022-12-20 ENCOUNTER — APPOINTMENT (OUTPATIENT)
Dept: CT IMAGING | Facility: IMAGING CENTER | Age: 67
End: 2022-12-20

## 2022-12-20 ENCOUNTER — APPOINTMENT (OUTPATIENT)
Dept: RADIOLOGY | Facility: IMAGING CENTER | Age: 67
End: 2022-12-20

## 2022-12-20 ENCOUNTER — OUTPATIENT (OUTPATIENT)
Dept: OUTPATIENT SERVICES | Facility: HOSPITAL | Age: 67
LOS: 1 days | End: 2022-12-20
Payer: MEDICARE

## 2022-12-20 DIAGNOSIS — C34.90 MALIGNANT NEOPLASM OF UNSPECIFIED PART OF UNSPECIFIED BRONCHUS OR LUNG: ICD-10-CM

## 2022-12-20 DIAGNOSIS — Z90.2 ACQUIRED ABSENCE OF LUNG [PART OF]: Chronic | ICD-10-CM

## 2022-12-20 PROCEDURE — 71046 X-RAY EXAM CHEST 2 VIEWS: CPT | Mod: 26

## 2022-12-20 PROCEDURE — 71046 X-RAY EXAM CHEST 2 VIEWS: CPT

## 2022-12-27 ENCOUNTER — APPOINTMENT (OUTPATIENT)
Dept: THORACIC SURGERY | Facility: CLINIC | Age: 67
End: 2022-12-27
Payer: MEDICARE

## 2023-01-03 ENCOUNTER — RESULT REVIEW (OUTPATIENT)
Age: 68
End: 2023-01-03

## 2023-01-03 ENCOUNTER — APPOINTMENT (OUTPATIENT)
Dept: THORACIC SURGERY | Facility: CLINIC | Age: 68
End: 2023-01-03
Payer: MEDICARE

## 2023-01-03 VITALS
HEART RATE: 74 BPM | SYSTOLIC BLOOD PRESSURE: 117 MMHG | RESPIRATION RATE: 17 BRPM | WEIGHT: 110 LBS | BODY MASS INDEX: 18.33 KG/M2 | HEIGHT: 65 IN | OXYGEN SATURATION: 98 % | DIASTOLIC BLOOD PRESSURE: 82 MMHG

## 2023-01-03 PROCEDURE — 99214 OFFICE O/P EST MOD 30 MIN: CPT

## 2023-01-05 NOTE — ASSESSMENT
[FreeTextEntry1] : Ms. Rodrigues is a 67 year old female, former smoker (quit 2010, 40 PYHx) PMHx includes HTN, HLD. \par \par She is s/p right VATS, RLL wedge resection, anatomic RLL superior segmentectomy, MLND on 2/15/18 with pathology revealing adenocarcinoma, papillary predominant, pT1bN0. Tumor size 1.1 cm. Additionally, she is is s/p left thoracotomy, SAMANTHA lobectomy, lysis of adhesions, MLND on 9/3/2010 with Dr. 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 \par \par Patient was seen on 06/21/2022, recommended 6 months CT chest w/o contrast, however, CT chest denied by insurance. \par \par CXR on 12/20/2022:\par - Status post LEFT upper lobe lobectomy and the LEFT lower lobe wedge resection.\par - Post operative atelectatic/fibrotic  LEFT upper zone apical parenchymal opacities.\par - No new airspace consolidation or effusion.\par \par I have reviewed the patient's medical records and diagnostic images at time of this office consultation and have made the following recommendation:\par 1. CXR reviewed and explained to patient, 11 lbs weight loss since 06/2022, discussed PET/CT now for evaluation.\par 2. F/u with Hem/Onc\par \par I, Dr. CAMERON [facility], personally performed the evaluation and management (E/M) services for this established patient who follow up today with an existing condition.  That E/M includes conducting the examination, assessing all new/exacerbated/existing conditions, and establishing a plan of care.  Today, my ACP, EMI WalkerC, was here to observe my evaluation and management services for this existing condition to be followed going forward. \par \par \par

## 2023-01-05 NOTE — HISTORY OF PRESENT ILLNESS
[FreeTextEntry1] : Ms. Rodrigues is a 67 year old female, former smoker (quit 2010, 40 PYHx) PMHx includes HTN, HLD. \par \par She is s/p right VATS, RLL wedge resection, anatomic RLL superior segmentectomy, MLND on 2/15/18 with pathology revealing adenocarcinoma, papillary predominant, pT1bN0. Tumor size 1.1 cm. Additionally, she is is s/p left thoracotomy, SAMANTHA lobectomy, lysis of adhesions, MLND on 9/3/2010 with Dr. 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 \par \par CT chest scan 5/19/2020:\par - LLL paramediastinal opacities and bronchiectasis are secondary to radiation treatment \par \par CT chest on 12/02/2020:\par - post-op changes\par - left perihilar postradiation changes are stable\par \par CT chest on 6/14/21: \par - s/p LULobectomy with stable post op and post radiation therapy changes\par \par CT Chest on 12/15/21:\par - s/p SAMANTHA lobectomy\par - LLL paramediastinal consolidation is unchanged and is likely the sequelae of prior radiation treatment\par \par CT Chest on 6/14/2022:\par - No endobronchial lesions, no hilar and/or mediastinal adenopathy noted\par - Small pericardial effusion noted, no pleural effusions are noted\par - Patient is status post left upper lobectomy as well as wedge resection in the right lower lobe\par - Postradiation therapy changes are present in the left lower lobe, unchanged\par - Emphysematous changes are present in both lungs\par \par Patient was seen on 06/21/2022, recommended 6 months CT chest w/o contrast, however, CT chest denied by insurance. \par \par CXR on 12/20/2022:\par - Status post LEFT upper lobe lobectomy and the LEFT lower lobe wedge resection.\par - Post operative atelectatic/fibrotic  LEFT upper zone apical parenchymal opacities.\par - No new airspace consolidation or effusion.\par \par Patient presents to office for follow up.  Patient denies shortness of breath, cough, chest pain, fever, chills. C/o 11 lbs weight loss since 06/2022, c/o no appetite.

## 2023-01-05 NOTE — CONSULT LETTER
[FreeTextEntry2] : Dr. Raymundo Smith (Onc/ref)\par Dr. Mendoza (PCP)\par Dr. Khanh Eddy (Cardio)  [FreeTextEntry3] : \par \par \par Raymundo Weldon MD, FACS \par Chief, Division of Thoracic Surgery \par Director, Minimally Invasive Thoracic Surgery \par Department of Cardiovascular and Thoracic Surgery \par [facility] \par , Cardiovascular and Thoracic Surgery

## 2023-01-13 ENCOUNTER — APPOINTMENT (OUTPATIENT)
Dept: CT IMAGING | Facility: IMAGING CENTER | Age: 68
End: 2023-01-13
Payer: MEDICARE

## 2023-01-13 ENCOUNTER — OUTPATIENT (OUTPATIENT)
Dept: OUTPATIENT SERVICES | Facility: HOSPITAL | Age: 68
LOS: 1 days | End: 2023-01-13
Payer: MEDICARE

## 2023-01-13 DIAGNOSIS — R91.1 SOLITARY PULMONARY NODULE: ICD-10-CM

## 2023-01-13 DIAGNOSIS — C34.90 MALIGNANT NEOPLASM OF UNSPECIFIED PART OF UNSPECIFIED BRONCHUS OR LUNG: ICD-10-CM

## 2023-01-13 DIAGNOSIS — Z90.2 ACQUIRED ABSENCE OF LUNG [PART OF]: Chronic | ICD-10-CM

## 2023-01-13 PROCEDURE — 71260 CT THORAX DX C+: CPT | Mod: 26,MH

## 2023-01-13 PROCEDURE — 74177 CT ABD & PELVIS W/CONTRAST: CPT

## 2023-01-13 PROCEDURE — 71260 CT THORAX DX C+: CPT

## 2023-01-13 PROCEDURE — 74177 CT ABD & PELVIS W/CONTRAST: CPT | Mod: 26

## 2023-01-17 ENCOUNTER — APPOINTMENT (OUTPATIENT)
Dept: THORACIC SURGERY | Facility: CLINIC | Age: 68
End: 2023-01-17
Payer: COMMERCIAL

## 2023-01-17 VITALS
WEIGHT: 112 LBS | HEART RATE: 61 BPM | DIASTOLIC BLOOD PRESSURE: 71 MMHG | HEIGHT: 65 IN | OXYGEN SATURATION: 99 % | BODY MASS INDEX: 18.66 KG/M2 | SYSTOLIC BLOOD PRESSURE: 100 MMHG

## 2023-01-17 PROCEDURE — 99214 OFFICE O/P EST MOD 30 MIN: CPT

## 2023-01-19 NOTE — HISTORY OF PRESENT ILLNESS
[FreeTextEntry1] : Ms. Rodrigues is a 67 year old female, former smoker (quit 2010, 40 PYHx) PMHx includes HTN, HLD. \par \par She is s/p right VATS, RLL wedge resection, anatomic RLL superior segmentectomy, MLND on 2/15/18 with pathology revealing adenocarcinoma, papillary predominant, pT1bN0. Tumor size 1.1 cm. Additionally, she is is s/p left thoracotomy, SAMANTHA lobectomy, lysis of adhesions, MLND on 9/3/2010 with Dr. 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 \par \par CT chest scan 5/19/2020:\par - LLL paramediastinal opacities and bronchiectasis are secondary to radiation treatment \par \par CT chest on 12/02/2020:\par - post-op changes\par - left perihilar postradiation changes are stable\par \par CT chest on 6/14/21: \par - s/p LULobectomy with stable post op and post radiation therapy changes\par \par CT Chest on 12/15/21:\par - s/p SAMANTHA lobectomy\par - LLL paramediastinal consolidation is unchanged and is likely the sequelae of prior radiation treatment\par \par CT Chest on 6/14/2022:\par - No endobronchial lesions, no hilar and/or mediastinal adenopathy noted\par - Small pericardial effusion noted, no pleural effusions are noted\par - Patient is status post left upper lobectomy as well as wedge resection in the right lower lobe\par - Postradiation therapy changes are present in the left lower lobe, unchanged\par - Emphysematous changes are present in both lungs\par \par Patient was seen on 06/21/2022, recommended 6 months CT chest w/o contrast, however, CT chest denied by insurance. \par \par CXR on 12/20/2022:\par - Status post LEFT upper lobe lobectomy and the LEFT lower lobe wedge resection.\par - Post operative atelectatic/fibrotic  LEFT upper zone apical parenchymal opacities.\par - No new airspace consolidation or effusion.\par \par CT CAP on 1/13/22:\par -  Status post left upper lobectomy and right lower lobe superior segmentectomy with stable postsurgical changes. Perihilar consolidation, architectural distortion, volume loss and bronchiectasis within left upper lung likely representing postradiation changes are also stable. Stable mild subpleural scarring within the right apex. The lungs are otherwise clear. No new or enlarging nodule.\par - No evidence of metastatic disease within abdomen or pelvis.\par \par Patient presents to office for follow up.  Last seen 1/3/23- CXR reviewed and explained to patient, 11 lbs weight loss since 06/2022, discussed PET/CT however, denied by Insurance and now here today with CT CAP. Endorses 2 lb weight gain. Attributes previously weight loss to emotional issues that she was experiencing at the time. Today, patient denies worsening SOB, chest pain, cough, hemoptysis, fever, chills, night sweats, lightheadedness or dizziness.\par \par

## 2023-01-19 NOTE — ASSESSMENT
[FreeTextEntry1] : Ms. Rodrigues is a 67 year old female, former smoker (quit 2010, 40 PYHx) PMHx includes HTN, HLD. \par \par She is s/p right VATS, RLL wedge resection, anatomic RLL superior segmentectomy, MLND on 2/15/18 with pathology revealing adenocarcinoma, papillary predominant, pT1bN0. Tumor size 1.1 cm. Additionally, she is is s/p left thoracotomy, SAMANHTA lobectomy, lysis of adhesions, MLND on 9/3/2010 with Dr. 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 \par \par I have reviewed the patient's medical records and diagnostic images at time of this office consultation and have made the following recommendation:\par - Images reviewed. Recommendation to return to clinic in 6 months with CT Chest, no contrast to re-evaluate stability. \par - [facility] \par \par Recommendations reviewed with patient during this office visit, and all questions answered; Patient instructed on the importance of follow up and verbalizes understanding.\par \par I, Dr. CAMERON [facility], personally performed the evaluation and management (E/M) services for this established patient. That E/M includes conducting the examination, assessing all new/exacerbated conditions, and establishing a new plan of care. Today, My ACP, Nancy Ruiz, was here to observe my evaluation and management services for this patient to be followed going forward.\par \par \par \par

## 2023-01-19 NOTE — CONSULT LETTER
[Dear  ___] : Dear  [unfilled], [Courtesy Letter:] : I had the pleasure of seeing your patient, [unfilled], in my office today. [Please see my note below.] : Please see my note below. [Consult Closing:] : Thank you very much for allowing me to participate in the care of this patient.  If you have any questions, please do not hesitate to contact me. [Sincerely,] : Sincerely, [FreeTextEntry2] : Dr. Raymundo Smith (Onc/ref)\par Dr. Mendoza (PCP)\par Dr. Khanh Eddy (Cardio)  [FreeTextEntry3] : Raymundo Weldno MD, FACS \par Chief, Division of Thoracic Surgery \par Director, Minimally Invasive Thoracic Surgery \par Department of Cardiovascular and Thoracic Surgery \par [facility] \par , Cardiovascular and Thoracic Surgery\par  \par \par \par Raymundo Weldon MD, FACS \par Chief, Division of Thoracic Surgery \par Director, Minimally Invasive Thoracic Surgery \par Department of Cardiovascular and Thoracic Surgery \par [facility] \par , Cardiovascular and Thoracic Surgery

## 2023-02-01 NOTE — H&P PST ADULT - BP NONINVASIVE SYSTOLIC (MM HG)
100 Protopic Counseling: Patient may experience a mild burning sensation during topical application. Protopic is not approved in children less than 2 years of age. There have been case reports of hematologic and skin malignancies in patients using topical calcineurin inhibitors although causality is questionable.

## 2023-07-24 NOTE — ASU PATIENT PROFILE, ADULT - SURGICAL SITE INCISION
Subjective   Jennifer Manning is a 44 y.o. female. Patient is here today for   Chief Complaint   Patient presents with    Foot Injury     Right foot pain, started in the past 2 weeks, hurts when putting pressure on it.           Vitals:    07/24/23 1111   BP: 130/84   Pulse: 96   Resp: 16   Temp: 98.4 °F (36.9 °C)   SpO2: 97%     Body mass index is 33.35 kg/m².      Past Medical History:   Diagnosis Date    Abnormal Pap smear of cervix     abn pap and HPV+, f/u normal    Anxiety     Asthma     Degeneration, intervertebral disc, cervical     Degenerative disc disease, cervical     Degenerative disc disease, cervical     Depression     GERD (gastroesophageal reflux disease)     Gestational hypertension     HPV (human papilloma virus) infection     condyloma and HPV+ pap      No Known Allergies   Social History     Socioeconomic History    Marital status: Single   Tobacco Use    Smoking status: Every Day     Packs/day: 0.50     Years: 15.00     Pack years: 7.50     Types: Cigarettes, Electronic Cigarette    Smokeless tobacco: Current    Tobacco comments:     LAST 11/17 mn   Vaping Use    Vaping Use: Some days   Substance and Sexual Activity    Alcohol use: Yes     Alcohol/week: 7.0 standard drinks     Types: 5 Glasses of wine, 2 Drinks containing 0.5 oz of alcohol per week     Comment: 4-5 drinks per week    Drug use: Not Currently     Types: Marijuana    Sexual activity: Yes     Partners: Male     Birth control/protection: Surgical        Current Outpatient Medications:     albuterol sulfate HFA (ProAir HFA) 108 (90 Base) MCG/ACT inhaler, Inhale 2 puffs Every 6 (Six) Hours As Needed for wheezing., Disp: 8.5 g, Rfl: 10    amphetamine-dextroamphetamine (Adderall) 30 MG tablet, Take 1 tablet by mouth 2 (Two) Times a Day., Disp: 180 tablet, Rfl: 0    cyclobenzaprine (FLEXERIL) 10 MG tablet, Take 1 tablet by mouth 3 (Three) Times a Day As Needed for Muscle Spasms., Disp: 270 tablet, Rfl: 1    famotidine (Pepcid) 20 MG tablet,  Take 1 tablet by mouth 2 (Two) Times a Day., Disp: 60 tablet, Rfl: 3    FLUoxetine (PROzac) 20 MG capsule, Take 1 capsule by mouth Daily., Disp: 90 capsule, Rfl: 3    ipratropium-albuterol (DUO-NEB) 0.5-2.5 mg/3 ml nebulizer, Take 3 mL by nebulization Every 4 (Four) Hours As Needed for Wheezing., Disp: 360 mL, Rfl: 0    multivitamin with minerals tablet tablet, Take 1 tablet by mouth Daily., Disp: , Rfl:     omeprazole (priLOSEC) 40 MG capsule, Take 1 capsule by mouth Daily., Disp: 90 capsule, Rfl: 1    ondansetron ODT (ZOFRAN-ODT) 8 MG disintegrating tablet, Place 1 tablet on the tongue Every 8 (Eight) Hours As Needed for Nausea or Vomiting., Disp: 40 tablet, Rfl: 1    predniSONE (DELTASONE) 20 MG tablet, Take 1 tablet by mouth Daily., Disp: 5 tablet, Rfl: 0    Scopolamine (Transderm-Scop, 1.5 MG,) 1.5 MG/3DAYS patch, Place 1 patch on the skin as directed by provider Every 72 (Seventy-Two) Hours., Disp: 10 patch, Rfl: 2    meloxicam (Mobic) 7.5 MG tablet, Take 1 tablet by mouth Daily., Disp: 30 tablet, Rfl: 1     Objective     History of Present Illness  Melanie complains of pain in her right foot back towards the heel on the lateral and plantar surface.  Is been going on for a few months.    Generally the first step over the course of the day is the worst.    Her pain has been present about a month now.  Foot Injury        Review of Systems   Constitutional: Negative.    HENT: Negative.     Respiratory: Negative.     Musculoskeletal:         She has pain in her right foot as described in the HPI.     Physical Exam  Vitals and nursing note reviewed.   Constitutional:       Appearance: Normal appearance.      Comments: Pleasant, neatly groomed, no distress.   Cardiovascular:      Rate and Rhythm: Regular rhythm.      Heart sounds: Normal heart sounds. No murmur heard.    No gallop.   Neurological:      Mental Status: She is alert and oriented to person, place, and time.   Psychiatric:         Mood and Affect: Mood  normal.         Behavior: Behavior normal.         Thought Content: Thought content normal.         Problems Addressed this Visit          Musculoskeletal and Injuries    Plantar fasciitis of right foot - Primary     Neck          Diagnoses         Codes Comments    Plantar fasciitis of right foot    -  Primary ICD-10-CM: M72.2  ICD-9-CM: 728.71               PLAN  She took a look at [email or web address].    I asked her to try meloxicam 7 and half milligrams 1 or 2 p.o. daily as needed I would like her not to use this for more than a couple weeks at a time.  She will likely go due to feeling better over the next couple weeks.  If not, she would probably benefit from physical therapy.  No follow-ups on file.   no

## 2023-08-04 ENCOUNTER — APPOINTMENT (OUTPATIENT)
Dept: CT IMAGING | Facility: IMAGING CENTER | Age: 68
End: 2023-08-04
Payer: MEDICARE

## 2023-08-04 ENCOUNTER — OUTPATIENT (OUTPATIENT)
Dept: OUTPATIENT SERVICES | Facility: HOSPITAL | Age: 68
LOS: 1 days | End: 2023-08-04
Payer: MEDICARE

## 2023-08-04 DIAGNOSIS — C34.90 MALIGNANT NEOPLASM OF UNSPECIFIED PART OF UNSPECIFIED BRONCHUS OR LUNG: ICD-10-CM

## 2023-08-04 DIAGNOSIS — Z90.2 ACQUIRED ABSENCE OF LUNG [PART OF]: Chronic | ICD-10-CM

## 2023-08-04 PROCEDURE — 71250 CT THORAX DX C-: CPT | Mod: 26,MH

## 2023-08-04 PROCEDURE — 71250 CT THORAX DX C-: CPT

## 2023-08-15 ENCOUNTER — APPOINTMENT (OUTPATIENT)
Dept: THORACIC SURGERY | Facility: CLINIC | Age: 68
End: 2023-08-15
Payer: MEDICARE

## 2023-08-15 VITALS
BODY MASS INDEX: 18.33 KG/M2 | WEIGHT: 110 LBS | RESPIRATION RATE: 17 BRPM | HEART RATE: 73 BPM | SYSTOLIC BLOOD PRESSURE: 145 MMHG | OXYGEN SATURATION: 98 % | HEIGHT: 65 IN | DIASTOLIC BLOOD PRESSURE: 93 MMHG

## 2023-08-15 PROCEDURE — 99214 OFFICE O/P EST MOD 30 MIN: CPT

## 2023-08-15 RX ORDER — TRIAMTERENE AND HYDROCHLOROTHIAZIDE 25; 37.5 MG/1; MG/1
37.5-25 TABLET ORAL
Refills: 0 | Status: ACTIVE | COMMUNITY

## 2023-08-15 RX ORDER — ROSUVASTATIN CALCIUM 20 MG/1
20 TABLET, FILM COATED ORAL
Qty: 90 | Refills: 0 | Status: DISCONTINUED | COMMUNITY
Start: 2022-03-22 | End: 2023-08-15

## 2023-08-15 RX ORDER — PNV NO.95/FERROUS FUM/FOLIC AC 28MG-0.8MG
TABLET ORAL
Refills: 0 | Status: ACTIVE | COMMUNITY

## 2023-08-15 NOTE — PHYSICAL EXAM
[Sclera] : the sclera and conjunctiva were normal [PERRL With Normal Accommodation] : pupils were equal in size, round, and reactive to light [Extraocular Movements] : extraocular movements were intact [Neck Appearance] : the appearance of the neck was normal [Neck Cervical Mass (___cm)] : no neck mass was observed [Thyroid Diffuse Enlargement] : the thyroid was not enlarged [Jugular Venous Distention Increased] : there was no jugular-venous distention [Thyroid Nodule] : there were no palpable thyroid nodules [] : no respiratory distress [Auscultation Breath Sounds / Voice Sounds] : lungs were clear to auscultation bilaterally [Heart Rate And Rhythm] : heart rate was normal and rhythm regular [Heart Sounds] : normal S1 and S2 [Heart Sounds Gallop] : no gallops [Murmurs] : no murmurs [Heart Sounds Pericardial Friction Rub] : no pericardial rub [Examination Of The Chest] : the chest was normal in appearance [Chest Visual Inspection Thoracic Asymmetry] : no chest asymmetry [Diminished Respiratory Excursion] : normal chest expansion [2+] : left 2+ [No Abnormalities] : the abdominal aorta was not enlarged and no bruit was heard [Right Carotid Bruit] : no bruit heard over the right carotid [Left Carotid Bruit] : no bruit heard over the left carotid [Right Femoral Bruit] : no bruit heard over the right femoral artery [Left Femoral Bruit] : no bruit heard over the left femoral artery

## 2023-08-15 NOTE — ASSESSMENT
[FreeTextEntry1] : Ms. Rodrigues is a 68 year old female, former smoker (quit 2010, 40 PYHx) PMHx includes HTN, HLD.   She is s/p right VATS, RLL wedge resection, anatomic RLL superior segmentectomy, MLND on 2/15/18 with pathology revealing adenocarcinoma, papillary predominant, pT1bN0. Tumor size 1.1 cm. Additionally, she is is s/p left thoracotomy, SAMANTHA lobectomy, lysis of adhesions, MLND on 9/3/2010 with Dr. 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   Here today with follow up imaging.  I have reviewed the patient's medical records and diagnostic images at time of this office consultation and have made the following recommendation: - Follow up with CT chest without contrast in 6 months.  Recommendations reviewed with patient during this office visit, and all questions answered; Patient instructed on the importance of follow up and verbalizes understanding.

## 2023-08-15 NOTE — HISTORY OF PRESENT ILLNESS
[FreeTextEntry1] : Ms. Rodrigues is a 67 year old female, former smoker (quit 2010, 40 PYHx) PMHx includes HTN, HLD.   She is s/p right VATS, RLL wedge resection, anatomic RLL superior segmentectomy, MLND on 2/15/18 with pathology revealing adenocarcinoma, papillary predominant, pT1bN0. Tumor size 1.1 cm. Additionally, she is is s/p left thoracotomy, SAMANTHA lobectomy, lysis of adhesions, MLND on 9/3/2010 with Dr. 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   CT chest scan 5/19/2020: - LLL paramediastinal opacities and bronchiectasis are secondary to radiation treatment   CT chest on 12/02/2020: - post-op changes - left perihilar postradiation changes are stable  CT chest on 6/14/21:  - s/p LULobectomy with stable post op and post radiation therapy changes  CT Chest on 12/15/21: - s/p SAMANTHA lobectomy - LLL paramediastinal consolidation is unchanged and is likely the sequelae of prior radiation treatment  CT Chest on 6/14/2022: - No endobronchial lesions, no hilar and/or mediastinal adenopathy noted - Small pericardial effusion noted, no pleural effusions are noted - Patient is status post left upper lobectomy as well as wedge resection in the right lower lobe - Postradiation therapy changes are present in the left lower lobe, unchanged - Emphysematous changes are present in both lungs  Patient was seen on 06/21/2022, recommended 6 months CT chest w/o contrast, however, CT chest denied by insurance.   CXR on 12/20/2022: - Status post LEFT upper lobe lobectomy and the LEFT lower lobe wedge resection. - Post operative atelectatic/fibrotic  LEFT upper zone apical parenchymal opacities. - No new airspace consolidation or effusion.  CT CAP on 1/13/22: -  Status post left upper lobectomy and right lower lobe superior segmentectomy with stable postsurgical changes. Perihilar consolidation, architectural distortion, volume loss and bronchiectasis within left upper lung likely representing postradiation changes are also stable. Stable mild subpleural scarring within the right apex. The lungs are otherwise clear. No new or enlarging nodule. - No evidence of metastatic disease within abdomen or pelvis.  - 1/3/23- CXR reviewed and explained to patient, 11 lbs weight loss since 06/2022, discussed PET/CT however, denied by Insurance and presented with CT CAP. Endorses 2 lb weight gain. Attributes previously weight loss to emotional issues that she was experiencing at the time.   CT Chest on 08/04/2023: - Status post left upper lobectomy and right lower lobe superior segmentectomy. Stable left lung postradiation changes. - No new disease in the chest.  Patient presents to office for follow up. She reports feeling well overall. Denies shortness of breath, cough, activity intolerance, or recent hospitalizations.

## 2023-08-15 NOTE — CONSULT LETTER
[Dear  ___] : Dear  [unfilled], [Courtesy Letter:] : I had the pleasure of seeing your patient, [unfilled], in my office today. [Please see my note below.] : Please see my note below. [Consult Closing:] : Thank you very much for allowing me to participate in the care of this patient.  If you have any questions, please do not hesitate to contact me. [Sincerely,] : Sincerely, [FreeTextEntry2] : Dr. Raymundo Smith (Onc/ref)\par  Dr. Mendoza (PCP)\par  Dr. Khanh Eddy (Cardio)  [FreeTextEntry3] : Raymundo Weldon MD, FACS \par  Chief, Division of Thoracic Surgery \par  Director, Minimally Invasive Thoracic Surgery \par  Department of Cardiovascular and Thoracic Surgery \par  [facility] \par  , Cardiovascular and Thoracic Surgery\par   \par  \par  \par  Raymundo Weldon MD, FACS \par  Chief, Division of Thoracic Surgery \par  Director, Minimally Invasive Thoracic Surgery \par  Department of Cardiovascular and Thoracic Surgery \par  [facility] \par  , Cardiovascular and Thoracic Surgery

## 2024-03-06 ENCOUNTER — NON-APPOINTMENT (OUTPATIENT)
Age: 69
End: 2024-03-06

## 2024-03-08 ENCOUNTER — APPOINTMENT (OUTPATIENT)
Dept: CT IMAGING | Facility: IMAGING CENTER | Age: 69
End: 2024-03-08
Payer: MEDICARE

## 2024-03-08 ENCOUNTER — OUTPATIENT (OUTPATIENT)
Dept: OUTPATIENT SERVICES | Facility: HOSPITAL | Age: 69
LOS: 1 days | End: 2024-03-08
Payer: MEDICARE

## 2024-03-08 DIAGNOSIS — C34.90 MALIGNANT NEOPLASM OF UNSPECIFIED PART OF UNSPECIFIED BRONCHUS OR LUNG: ICD-10-CM

## 2024-03-08 DIAGNOSIS — Z90.2 ACQUIRED ABSENCE OF LUNG [PART OF]: Chronic | ICD-10-CM

## 2024-03-08 PROCEDURE — 71250 CT THORAX DX C-: CPT | Mod: 26,MH

## 2024-03-08 PROCEDURE — 71250 CT THORAX DX C-: CPT

## 2024-03-26 ENCOUNTER — APPOINTMENT (OUTPATIENT)
Dept: THORACIC SURGERY | Facility: CLINIC | Age: 69
End: 2024-03-26
Payer: MEDICARE

## 2024-03-26 VITALS
SYSTOLIC BLOOD PRESSURE: 139 MMHG | HEIGHT: 65 IN | WEIGHT: 109 LBS | HEART RATE: 67 BPM | OXYGEN SATURATION: 99 % | RESPIRATION RATE: 16 BRPM | DIASTOLIC BLOOD PRESSURE: 92 MMHG | BODY MASS INDEX: 18.16 KG/M2

## 2024-03-26 DIAGNOSIS — R91.1 SOLITARY PULMONARY NODULE: ICD-10-CM

## 2024-03-26 DIAGNOSIS — C34.90 MALIGNANT NEOPLASM OF UNSPECIFIED PART OF UNSPECIFIED BRONCHUS OR LUNG: ICD-10-CM

## 2024-03-26 PROCEDURE — 99213 OFFICE O/P EST LOW 20 MIN: CPT

## 2024-03-26 RX ORDER — FERROUS GLUCONATE 256(28)MG
TABLET ORAL
Refills: 0 | Status: ACTIVE | COMMUNITY

## 2024-03-26 RX ORDER — FAMOTIDINE 20 MG/1
20 TABLET, FILM COATED ORAL
Refills: 0 | Status: ACTIVE | COMMUNITY

## 2024-03-27 PROBLEM — R91.1 LUNG NODULE: Status: ACTIVE | Noted: 2018-01-23

## 2024-03-27 NOTE — HISTORY OF PRESENT ILLNESS
[FreeTextEntry1] : Ms. Rodrigues is a 68 year old female, former smoker (quit 2010, 40 PYHx) PMHx includes HTN, HLD.   She is s/p right VATS, RLL wedge resection, anatomic RLL superior segmentectomy, MLND on 2/15/18 with pathology revealing adenocarcinoma, papillary predominant, pT1bN0. Tumor size 1.1 cm. Additionally, she is is s/p left thoracotomy, SAMANTHA lobectomy, lysis of adhesions, MLND on 9/3/2010 with Dr. 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   CT chest scan 5/19/2020: - LLL paramediastinal opacities and bronchiectasis are secondary to radiation treatment   CT chest on 12/02/2020: - post-op changes - left perihilar postradiation changes are stable  CT chest on 6/14/21:  - s/p LULobectomy with stable post op and post radiation therapy changes  CT Chest on 12/15/21: - s/p SAMANTHA lobectomy - LLL paramediastinal consolidation is unchanged and is likely the sequelae of prior radiation treatment  CT Chest on 6/14/2022: - No endobronchial lesions, no hilar and/or mediastinal adenopathy noted - Small pericardial effusion noted, no pleural effusions are noted - Patient is status post left upper lobectomy as well as wedge resection in the right lower lobe - Postradiation therapy changes are present in the left lower lobe, unchanged - Emphysematous changes are present in both lungs  Patient was seen on 06/21/2022, recommended 6 months CT chest w/o contrast, however, CT chest denied by insurance.   CXR on 12/20/2022: - Status post LEFT upper lobe lobectomy and the LEFT lower lobe wedge resection. - Post operative atelectatic/fibrotic  LEFT upper zone apical parenchymal opacities. - No new airspace consolidation or effusion.  CT CAP on 1/13/22: -  Status post left upper lobectomy and right lower lobe superior segmentectomy with stable postsurgical changes. Perihilar consolidation, architectural distortion, volume loss and bronchiectasis within left upper lung likely representing postradiation changes are also stable. Stable mild subpleural scarring within the right apex. The lungs are otherwise clear. No new or enlarging nodule. - No evidence of metastatic disease within abdomen or pelvis.  - 1/3/23- CXR reviewed and explained to patient, 11 lbs weight loss since 06/2022, discussed PET/CT however, denied by Insurance and presented with CT CAP. Endorses 2 lb weight gain. Attributes previously weight loss to emotional issues that she was experiencing at the time.   CT Chest on 08/04/2023: - Status post left upper lobectomy and right lower lobe superior segmentectomy. Stable left lung postradiation changes. - No new disease in the chest.  CT Chest on 3/8/24:  - s/p LULobectomy as well as wedge resection in the RLL.  - Emphysematous changes are present in both lungs. Once again, postradiation therapy changes are present in the left paramediastinal location.  - Below the diaphragm, visualized portions of the abdomen demonstrate low attenuation lesions within the liver which are unchanged when compared to previous exam.  Patient presents to office for follow up. Pt reports doing well, denies SOB, cough or CP.

## 2024-03-27 NOTE — CONSULT LETTER
[Dear  ___] : Dear  [unfilled], [Courtesy Letter:] : I had the pleasure of seeing your patient, [unfilled], in my office today. [Please see my note below.] : Please see my note below. [Consult Closing:] : Thank you very much for allowing me to participate in the care of this patient.  If you have any questions, please do not hesitate to contact me. [Sincerely,] : Sincerely, [FreeTextEntry2] : Dr. Raymundo Smith (Onc/ref)\par  Dr. Mendoza (PCP)\par  Dr. Khanh Eddy (Cardio)  [FreeTextEntry3] : Raymundo Weldon MD, FACS \par  Chief, Division of Thoracic Surgery \par  Director, Minimally Invasive Thoracic Surgery \par  Department of Cardiovascular and Thoracic Surgery \par  [facility] \par  , Cardiovascular and Thoracic Surgery\par   \par  \par  \par  Raymundo Weldon MD, FACS \par  Chief, Division of Thoracic Surgery \par  Director, Minimally Invasive Thoracic Surgery \par  Department of Cardiovascular and Thoracic Surgery \par  [facility] \par  , Cardiovascular and Thoracic Surgery

## 2024-03-27 NOTE — ASSESSMENT
[FreeTextEntry1] : Ms. Rodrigues is a 68 year old female, former smoker (quit 2010, 40 PYHx) PMHx includes HTN, HLD.   She is s/p right VATS, RLL wedge resection, anatomic RLL superior segmentectomy, MLND on 2/15/18 with pathology revealing adenocarcinoma, papillary predominant, pT1bN0. Tumor size 1.1 cm. Additionally, she is is s/p left thoracotomy, SAMANTHA lobectomy, lysis of adhesions, MLND on 9/3/2010 with Dr. 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   I have reviewed the patient's medical records and diagnostic images at time of this office consultation and have made the following recommendation: 1. CT reviewed, stable scan. RTC in 6 mons with CT Chest w/o contrast   I, Dr. CAMERON, [facility], personally performed the evaluation and management (E/M) services for this established patient who presents today with (a) new problem(s)/exacerbation of (an) existing condition(s).  That E/M includes conducting the examination, assessing all new/exacerbated conditions, and establishing a new plan of care.  Today, my ACP, GRUPO Collier was here to observe my evaluation and management services for this new problem/exacerbated condition to be followed going forward.

## 2024-05-21 NOTE — PATIENT PROFILE ADULT. - MENTAL HEALTH CONDITIONS/SYMPTOMS, PROFILE
Here fore follow-up of menorrhagia w/u. Saw Heme recently - Hgb improved with PO Fe (12.3 from 10.3). EMB benign. US showed possible small fibroid - not intracavitary. Stable small dermoid on left ovary (seen 2018 - same size, no sx).    Reviewed tx options - hormonal BC (had Mirena in past with lots of irreg spotting), TXA, ablation, hyst if others fail. Prefers medical options to start. Denies VMS.    Will start with TXA - aware small risk VTE, can take up to 5 days per cycle (has 3 days of heavy bleeding in 7 days total)    Plan:  Menorrhagia - Rx TXA, call if sx do not resolve/desires alternative.    35 minutes spent in history taking, exam, counseling, and documentation.   none

## 2024-09-17 ENCOUNTER — APPOINTMENT (OUTPATIENT)
Dept: CT IMAGING | Facility: IMAGING CENTER | Age: 69
End: 2024-09-17
Payer: MEDICARE

## 2024-09-17 ENCOUNTER — OUTPATIENT (OUTPATIENT)
Dept: OUTPATIENT SERVICES | Facility: HOSPITAL | Age: 69
LOS: 1 days | End: 2024-09-17
Payer: MEDICARE

## 2024-09-17 DIAGNOSIS — Z90.2 ACQUIRED ABSENCE OF LUNG [PART OF]: Chronic | ICD-10-CM

## 2024-09-17 DIAGNOSIS — R91.1 SOLITARY PULMONARY NODULE: ICD-10-CM

## 2024-09-17 DIAGNOSIS — C34.90 MALIGNANT NEOPLASM OF UNSPECIFIED PART OF UNSPECIFIED BRONCHUS OR LUNG: ICD-10-CM

## 2024-09-17 PROCEDURE — 71250 CT THORAX DX C-: CPT

## 2024-09-17 PROCEDURE — 71250 CT THORAX DX C-: CPT | Mod: 26,MH

## 2024-09-24 ENCOUNTER — APPOINTMENT (OUTPATIENT)
Dept: THORACIC SURGERY | Facility: CLINIC | Age: 69
End: 2024-09-24
Payer: MEDICARE

## 2024-09-24 VITALS
OXYGEN SATURATION: 98 % | RESPIRATION RATE: 18 BRPM | SYSTOLIC BLOOD PRESSURE: 141 MMHG | DIASTOLIC BLOOD PRESSURE: 101 MMHG | HEART RATE: 75 BPM

## 2024-09-24 DIAGNOSIS — C34.90 MALIGNANT NEOPLASM OF UNSPECIFIED PART OF UNSPECIFIED BRONCHUS OR LUNG: ICD-10-CM

## 2024-09-24 PROCEDURE — 99213 OFFICE O/P EST LOW 20 MIN: CPT

## 2024-09-24 PROCEDURE — G2211 COMPLEX E/M VISIT ADD ON: CPT

## 2024-09-24 NOTE — HISTORY OF PRESENT ILLNESS
[FreeTextEntry1] : Ms. Rodrigues is a 69 year old female, former smoker (quit 2010, 40 PYHx), current light smoker - started smoking again in 2022 after spouse death. PMHx includes HTN, HLD.   She is s/p right VATS, RLL wedge resection, anatomic RLL superior segmentectomy, MLND on 2/15/18 with pathology revealing adenocarcinoma, papillary predominant, pT1bN0. Tumor size 1.1 cm. Additionally, she is is s/p left thoracotomy, SAMANTHA lobectomy, lysis of adhesions, MLND on 9/3/2010 with Dr. 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   CT chest scan 5/19/2020: - LLL paramediastinal opacities and bronchiectasis are secondary to radiation treatment   CT chest on 12/02/2020: - post-op changes - left perihilar postradiation changes are stable  CT chest on 6/14/21:  - s/p LULobectomy with stable post op and post radiation therapy changes  CT Chest on 12/15/21: - s/p SAMANTHA lobectomy - LLL paramediastinal consolidation is unchanged and is likely the sequelae of prior radiation treatment  CT Chest on 6/14/2022: - No endobronchial lesions, no hilar and/or mediastinal adenopathy noted - Small pericardial effusion noted, no pleural effusions are noted - Patient is status post left upper lobectomy as well as wedge resection in the right lower lobe - Postradiation therapy changes are present in the left lower lobe, unchanged - Emphysematous changes are present in both lungs  Patient was seen on 06/21/2022, recommended 6 months CT chest w/o contrast, however, CT chest denied by insurance.   CXR on 12/20/2022: - Status post LEFT upper lobe lobectomy and the LEFT lower lobe wedge resection. - Post operative atelectatic/fibrotic  LEFT upper zone apical parenchymal opacities. - No new airspace consolidation or effusion.  CT CAP on 1/13/22: -  Status post left upper lobectomy and right lower lobe superior segmentectomy with stable postsurgical changes. Perihilar consolidation, architectural distortion, volume loss and bronchiectasis within left upper lung likely representing postradiation changes are also stable. Stable mild subpleural scarring within the right apex. The lungs are otherwise clear. No new or enlarging nodule. - No evidence of metastatic disease within abdomen or pelvis.  - 1/3/23- CXR reviewed and explained to patient, 11 lbs weight loss since 06/2022, discussed PET/CT however, denied by Insurance and presented with CT CAP. Endorses 2 lb weight gain. Attributes previously weight loss to emotional issues that she was experiencing at the time.   CT Chest on 08/04/2023: - Status post left upper lobectomy and right lower lobe superior segmentectomy. Stable left lung postradiation changes. - No new disease in the chest.  CT Chest on 3/8/24:  - s/p LULobectomy as well as wedge resection in the RLL.  - Emphysematous changes are present in both lungs. Once again, postradiation therapy changes are present in the left paramediastinal location.  - Below the diaphragm, visualized portions of the abdomen demonstrate low attenuation lesions within the liver which are unchanged when compared to previous exam.  CT chest on 9/17/2024: - Emphysema is present. - Status post left upper lobectomy and right lower lobe superior segmentectomy.  - Stable postradiation changes to the paramediastinal left lung. - Small pericardial effusion is unchanged.  - Coronary artery calcification. - Stable hepatic cysts and other subcentimeter hypodensities are too small to characterize. - Left posterolateral thoracotomy defect.  Patient presents to office for follow up. She denies any chest pain, SOB, cough, fever or chills.

## 2024-09-24 NOTE — CONSULT LETTER
[Dear  ___] : Dear  [unfilled], [Courtesy Letter:] : I had the pleasure of seeing your patient, [unfilled], in my office today. [Please see my note below.] : Please see my note below. [Consult Closing:] : Thank you very much for allowing me to participate in the care of this patient.  If you have any questions, please do not hesitate to contact me. [Sincerely,] : Sincerely, [FreeTextEntry2] : Dr. Raymundo Smith (Onc/ref)\par  Dr. Mendoza (PCP)\par  Dr. Khanh Eddy (Cardio)  [FreeTextEntry3] : Raymundo Weldon MD, FACS \par  Chief, Division of Thoracic Surgery \par  Director, Minimally Invasive Thoracic Surgery \par  Department of Cardiovascular and Thoracic Surgery \par  [facility] \par  , Cardiovascular and Thoracic Surgery\par   \par  \par  \par  Raymundo Weldon MD, FACS \par  Chief, Division of Thoracic Surgery \par  Director, Minimally Invasive Thoracic Surgery \par  Department of Cardiovascular and Thoracic Surgery \par  [facility] \par  , Cardiovascular and Thoracic Surgery

## 2024-09-24 NOTE — CONSULT LETTER
[Dear  ___] : Dear  [unfilled], [Courtesy Letter:] : I had the pleasure of seeing your patient, [unfilled], in my office today. [Please see my note below.] : Please see my note below. [Consult Closing:] : Thank you very much for allowing me to participate in the care of this patient.  If you have any questions, please do not hesitate to contact me. [Sincerely,] : Sincerely, [FreeTextEntry2] : Dr. Raymundo Smith (Onc/ref)\par  Dr. Mendoza (PCP)\par  Dr. Khanh Eddy (Cardio)  [FreeTextEntry3] : Raymundo Weldon MD, FACS \par  Chief, Division of Thoracic Surgery \par  Director, Minimally Invasive Thoracic Surgery \par  Department of Cardiovascular and Thoracic Surgery \par  [facility] \par  , Cardiovascular and Thoracic Surgery\par   \par  \par  \par  Raymundo Weldon MD, FACS \par  Chief, Division of Thoracic Surgery \par  Director, Minimally Invasive Thoracic Surgery \par  Department of Cardiovascular and Thoracic Surgery \par  [facility] \par  , Cardiovascular and Thoracic Surgery

## 2024-09-24 NOTE — ASSESSMENT
[FreeTextEntry1] : Ms. Rodrigues is a 69 year old female, former smoker (quit 2010, 40 PYHx) PMHx includes HTN, HLD.   She is s/p right VATS, RLL wedge resection, anatomic RLL superior segmentectomy, MLND on 2/15/18 with pathology revealing adenocarcinoma, papillary predominant, pT1bN0. Tumor size 1.1 cm. Additionally, she is is s/p left thoracotomy, SAMANTHA lobectomy, lysis of adhesions, MLND on 9/3/2010 with Dr. 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   I have reviewed the patient's medical records and diagnostic images at time of this office consultation and have made the following recommendation: 1. CT chest reviewed and discussed with patient, no new or recurrence for disease. Recommended to repeat non-contrast CT chest in 6 months for surveillance. 2. Elevated BP at office today, advise patient to follow up with PCP.   Recommendations reviewed with patient during this office visit, and all questions answered; Patient instructed on the importance of follow up and verbalizes understanding.  I, PRASHANT Del RioIM, personally performed the evaluation and management (E/M) services for this established patient who presents today with (a) new problem(s)/exacerbation of (an) existing condition(s). That E/M includes conducting the examination, assessing all new/exacerbated conditions, and establishing a new plan of care. Today, my ACP, GRUPO Dugan, was here to observe my evaluation and management services for this new problem/exacerbated condition to be followed going forward.

## 2024-12-26 ENCOUNTER — NON-APPOINTMENT (OUTPATIENT)
Age: 69
End: 2024-12-26

## 2024-12-28 ENCOUNTER — NON-APPOINTMENT (OUTPATIENT)
Age: 69
End: 2024-12-28

## 2025-01-17 NOTE — PATIENT PROFILE ADULT. - FALL HARM RISK CONCLUSION
Writer spoke to Ladonna miller [place] for transpotation setup. ETA 60-90 mins. [facility] charge RN and primary RN aware.   Universal Safety Interventions

## 2025-03-11 ENCOUNTER — APPOINTMENT (OUTPATIENT)
Dept: CT IMAGING | Facility: IMAGING CENTER | Age: 70
End: 2025-03-11

## 2025-03-11 ENCOUNTER — OUTPATIENT (OUTPATIENT)
Dept: OUTPATIENT SERVICES | Facility: HOSPITAL | Age: 70
LOS: 1 days | End: 2025-03-11
Payer: MEDICARE

## 2025-03-11 DIAGNOSIS — Z90.2 ACQUIRED ABSENCE OF LUNG [PART OF]: Chronic | ICD-10-CM

## 2025-03-11 DIAGNOSIS — C34.90 MALIGNANT NEOPLASM OF UNSPECIFIED PART OF UNSPECIFIED BRONCHUS OR LUNG: ICD-10-CM

## 2025-03-11 PROCEDURE — 71250 CT THORAX DX C-: CPT | Mod: 26

## 2025-03-11 PROCEDURE — 71250 CT THORAX DX C-: CPT | Mod: MH

## 2025-03-25 ENCOUNTER — APPOINTMENT (OUTPATIENT)
Dept: THORACIC SURGERY | Facility: CLINIC | Age: 70
End: 2025-03-25
Payer: MEDICARE

## 2025-03-25 DIAGNOSIS — C34.90 MALIGNANT NEOPLASM OF UNSPECIFIED PART OF UNSPECIFIED BRONCHUS OR LUNG: ICD-10-CM

## 2025-03-25 PROCEDURE — 99213 OFFICE O/P EST LOW 20 MIN: CPT | Mod: 93

## 2025-09-16 ENCOUNTER — APPOINTMENT (OUTPATIENT)
Dept: CT IMAGING | Facility: IMAGING CENTER | Age: 70
End: 2025-09-16
Payer: MEDICARE

## 2025-09-16 PROCEDURE — 71250 CT THORAX DX C-: CPT | Mod: 26
